# Patient Record
Sex: MALE | HISPANIC OR LATINO | Employment: FULL TIME | URBAN - METROPOLITAN AREA
[De-identification: names, ages, dates, MRNs, and addresses within clinical notes are randomized per-mention and may not be internally consistent; named-entity substitution may affect disease eponyms.]

---

## 2019-07-31 ENCOUNTER — TRANSCRIBE ORDERS (OUTPATIENT)
Dept: VASCULAR SURGERY | Facility: HOSPITAL | Age: 29
End: 2019-07-31

## 2019-08-05 ENCOUNTER — TRANSCRIBE ORDERS (OUTPATIENT)
Dept: ADMINISTRATIVE | Facility: HOSPITAL | Age: 29
End: 2019-08-05

## 2019-08-05 DIAGNOSIS — R79.89 ABNORMAL THYROID BLOOD TEST: Primary | ICD-10-CM

## 2019-08-14 ENCOUNTER — HOSPITAL ENCOUNTER (OUTPATIENT)
Dept: NUCLEAR MEDICINE | Facility: HOSPITAL | Age: 29
Discharge: HOME | End: 2019-08-14

## 2019-08-14 ENCOUNTER — HOSPITAL ENCOUNTER (OUTPATIENT)
Dept: ULTRASOUND IMAGING | Facility: HOSPITAL | Age: 29
Discharge: HOME | End: 2019-08-14
Admitting: NURSE PRACTITIONER

## 2019-08-14 DIAGNOSIS — R79.89 ABNORMAL THYROID BLOOD TEST: ICD-10-CM

## 2019-08-14 PROCEDURE — 76536 US EXAM OF HEAD AND NECK: CPT

## 2019-08-14 PROCEDURE — 0 SODIUM IODIDE 3.7 MBQ CAPSULE: Performed by: NURSE PRACTITIONER

## 2019-08-14 PROCEDURE — A9516 IODINE I-123 SOD IODIDE MIC: HCPCS | Performed by: NURSE PRACTITIONER

## 2019-08-14 PROCEDURE — 78014 THYROID IMAGING W/BLOOD FLOW: CPT

## 2019-08-14 RX ORDER — SODIUM IODIDE I 123 100 UCI/1
1 CAPSULE, GELATIN COATED ORAL
Status: COMPLETED | OUTPATIENT
Start: 2019-08-14 | End: 2019-08-14

## 2019-08-14 RX ADMIN — SODIUM IODIDE I 123 1 CAPSULE: 100 CAPSULE, GELATIN COATED ORAL at 12:00

## 2019-08-15 ENCOUNTER — HOSPITAL ENCOUNTER (OUTPATIENT)
Dept: NUCLEAR MEDICINE | Facility: HOSPITAL | Age: 29
Discharge: HOME | End: 2019-08-15

## 2019-12-16 ENCOUNTER — HOSPITAL ENCOUNTER (EMERGENCY)
Facility: HOSPITAL | Age: 29
Discharge: HOME | End: 2019-12-16
Attending: EMERGENCY MEDICINE | Admitting: EMERGENCY MEDICINE

## 2019-12-16 ENCOUNTER — APPOINTMENT (OUTPATIENT)
Dept: GENERAL RADIOLOGY | Facility: HOSPITAL | Age: 29
End: 2019-12-16

## 2019-12-16 VITALS
TEMPERATURE: 98 F | SYSTOLIC BLOOD PRESSURE: 142 MMHG | HEIGHT: 66 IN | OXYGEN SATURATION: 97 % | RESPIRATION RATE: 18 BRPM | HEART RATE: 74 BPM | DIASTOLIC BLOOD PRESSURE: 76 MMHG | BODY MASS INDEX: 46.13 KG/M2 | WEIGHT: 287.04 LBS

## 2019-12-16 DIAGNOSIS — R06.00 DYSPNEA, UNSPECIFIED TYPE: Primary | ICD-10-CM

## 2019-12-16 DIAGNOSIS — J20.9 ACUTE BRONCHITIS, UNSPECIFIED ORGANISM: ICD-10-CM

## 2019-12-16 LAB
FLUAV SUBTYP SPEC NAA+PROBE: NOT DETECTED
FLUBV RNA ISLT QL NAA+PROBE: NOT DETECTED

## 2019-12-16 PROCEDURE — 87502 INFLUENZA DNA AMP PROBE: CPT | Performed by: EMERGENCY MEDICINE

## 2019-12-16 PROCEDURE — 99283 EMERGENCY DEPT VISIT LOW MDM: CPT

## 2019-12-16 PROCEDURE — 93005 ELECTROCARDIOGRAM TRACING: CPT | Performed by: EMERGENCY MEDICINE

## 2019-12-16 PROCEDURE — 71045 X-RAY EXAM CHEST 1 VIEW: CPT

## 2019-12-16 RX ORDER — AZITHROMYCIN 250 MG/1
TABLET, FILM COATED ORAL
Qty: 6 TABLET | Refills: 0 | OUTPATIENT
Start: 2019-12-16 | End: 2020-05-27

## 2019-12-17 NOTE — ED PROVIDER NOTES
Subjective   Patient is a 29-year-old male complaint cough congestion shortness of breath that developed over the past several days.  The cough is productive.  He denies fever chest pain Bondar dysuria or other complaint.          Review of Systems  Negative for headache earache sore throat fever chest pain vomiting vomit diarrhea dysuria or other complaint  No past medical history on file.    No Known Allergies    No past surgical history on file.    No family history on file.    Social History     Socioeconomic History   • Marital status: Single     Spouse name: Not on file   • Number of children: Not on file   • Years of education: Not on file   • Highest education level: Not on file           Objective   Physical Exam  HEENT exam shows TMs to be clear.  Oropharynx, spit sclerae nonicteric.  Neck has no adenopathy JVD or bruits.  Lungs are clear.  Heart has a regular rate rhythm without murmur rub or gallop.  Chest is nontender.  Abdomen is soft nontender.  Extremity exam is no cyanosis or edema.  Procedures     EKG interpretation shows normal sinus rhythm with no acute ST change      ED Course      Results for orders placed or performed during the hospital encounter of 12/16/19   Influenza Antigen, Rapid - Swab, Nasopharynx   Result Value Ref Range    Influenza A PCR Not Detected Not Detected    Influenza B PCR Not Detected Not Detected     Xr Chest 1 View    Result Date: 12/16/2019  No active disease.  Electronically Signed By-Myke Dennis On:12/16/2019 6:54 PM This report was finalized on [id number] by  Myke Dennis, .                    No data recorded                        MDM  Number of Diagnoses or Management Options  Diagnosis management comments: Patient has findings consistent with acute bronchitis causing his dyspnea.  He has no evidence of pneumonia or influenza.  Will be discharged with a prescription for Zithromax and will see his MD for recheck.       Amount and/or Complexity of Data  Reviewed  Clinical lab tests: reviewed  Tests in the radiology section of CPT®: reviewed  Tests in the medicine section of CPT®: reviewed    Risk of Complications, Morbidity, and/or Mortality  Presenting problems: moderate  Diagnostic procedures: moderate  Management options: moderate    Patient Progress  Patient progress: stable      Final diagnoses:   Dyspnea, unspecified type   Acute bronchitis, unspecified organism              Dennis Berg MD  12/16/19 2019       Dennis Berg MD  12/16/19 2020

## 2020-05-27 ENCOUNTER — APPOINTMENT (OUTPATIENT)
Dept: GENERAL RADIOLOGY | Facility: HOSPITAL | Age: 30
End: 2020-05-27

## 2020-05-27 ENCOUNTER — HOSPITAL ENCOUNTER (EMERGENCY)
Facility: HOSPITAL | Age: 30
Discharge: HOME | End: 2020-05-27
Attending: EMERGENCY MEDICINE | Admitting: EMERGENCY MEDICINE

## 2020-05-27 VITALS
SYSTOLIC BLOOD PRESSURE: 128 MMHG | RESPIRATION RATE: 18 BRPM | HEIGHT: 66 IN | HEART RATE: 95 BPM | WEIGHT: 299.38 LBS | OXYGEN SATURATION: 99 % | BODY MASS INDEX: 48.12 KG/M2 | DIASTOLIC BLOOD PRESSURE: 72 MMHG | TEMPERATURE: 98.1 F

## 2020-05-27 DIAGNOSIS — J98.8 RESPIRATORY INFECTION: Primary | ICD-10-CM

## 2020-05-27 LAB — SARS-COV-2 RNA RESP QL NAA+PROBE: NOT DETECTED

## 2020-05-27 PROCEDURE — 99284 EMERGENCY DEPT VISIT MOD MDM: CPT

## 2020-05-27 PROCEDURE — 87635 SARS-COV-2 COVID-19 AMP PRB: CPT | Performed by: EMERGENCY MEDICINE

## 2020-05-27 PROCEDURE — 71046 X-RAY EXAM CHEST 2 VIEWS: CPT

## 2020-05-27 RX ORDER — ALBUTEROL SULFATE 90 UG/1
2 AEROSOL, METERED RESPIRATORY (INHALATION) EVERY 6 HOURS PRN
Qty: 1 INHALER | Refills: 0 | Status: SHIPPED | OUTPATIENT
Start: 2020-05-27

## 2020-05-27 RX ORDER — AZITHROMYCIN 250 MG/1
TABLET, FILM COATED ORAL
Qty: 6 TABLET | Refills: 0 | Status: SHIPPED | OUTPATIENT
Start: 2020-05-27 | End: 2022-04-15

## 2020-05-28 ENCOUNTER — PATIENT OUTREACH (OUTPATIENT)
Dept: CASE MANAGEMENT | Facility: OTHER | Age: 30
End: 2020-05-28

## 2020-05-28 NOTE — OUTREACH NOTE
ED Potential Covid Discharge Follow-up    Pt discharged from [facility] ED on 5/27/20, seen for cough, congestion, fever, chills, covid 19 testing performed. RN-ACM outreach call made to pt. Pt reports he called the health dept today and received negative covid test results. Pt reports he is feeling better, states he was a little short of breath this morning but he feels 100% better. Pt denies fever or chest pain. Pt reports he is taking the antibiotic as prescribed and he got the inhaler rx filled. Reviewed ED AVS with pt. Education provided. Emphasized importance of completing antibiotic prescription. Pt denies food, medication, or transportation insecurities. No questions or concerns per pt. Pt declines [facility] 24 hour nurse line and covid hotline numbers. Advised pt to call RN-ACM with any needs. Follow up outreach as needed.     Marcela Chappell RN  Ambulatory     5/28/2020, 13:53

## 2020-05-28 NOTE — OUTREACH NOTE
Care Coordination Note    Called pt's PCP office, no answer, left message informing PCP office staff that pt was seen at [facility] ED on 5/27/20, covid 19 testing was performed, pt reports he will be calling to schedule follow up. Left RN-ACM contact information to call with any questions.      Marcela Chappell RN  Ambulatory     5/28/2020, 14:06

## 2020-12-30 ENCOUNTER — HOSPITAL ENCOUNTER (EMERGENCY)
Facility: HOSPITAL | Age: 30
Discharge: HOME | End: 2020-12-31
Attending: EMERGENCY MEDICINE | Admitting: EMERGENCY MEDICINE

## 2020-12-30 VITALS
HEART RATE: 112 BPM | SYSTOLIC BLOOD PRESSURE: 170 MMHG | TEMPERATURE: 100.8 F | DIASTOLIC BLOOD PRESSURE: 84 MMHG | OXYGEN SATURATION: 96 % | BODY MASS INDEX: 45.19 KG/M2 | WEIGHT: 287.92 LBS | HEIGHT: 67 IN | RESPIRATION RATE: 20 BRPM

## 2020-12-30 DIAGNOSIS — R50.9 FEVER, UNSPECIFIED FEVER CAUSE: ICD-10-CM

## 2020-12-30 DIAGNOSIS — J02.9 ACUTE PHARYNGITIS, UNSPECIFIED ETIOLOGY: Primary | ICD-10-CM

## 2020-12-30 LAB — SARS-COV-2 RNA PNL SPEC NAA+PROBE: NORMAL

## 2020-12-30 PROCEDURE — 87635 SARS-COV-2 COVID-19 AMP PRB: CPT | Performed by: EMERGENCY MEDICINE

## 2020-12-30 PROCEDURE — C9803 HOPD COVID-19 SPEC COLLECT: HCPCS

## 2020-12-30 PROCEDURE — 99283 EMERGENCY DEPT VISIT LOW MDM: CPT

## 2020-12-30 RX ORDER — ACETAMINOPHEN 500 MG
1000 TABLET ORAL ONCE
Status: COMPLETED | OUTPATIENT
Start: 2020-12-30 | End: 2020-12-31

## 2020-12-31 LAB
ANION GAP SERPL CALCULATED.3IONS-SCNC: 12 MMOL/L (ref 5–15)
BASOPHILS # BLD AUTO: 0.1 10*3/MM3 (ref 0–0.2)
BASOPHILS NFR BLD AUTO: 0.5 % (ref 0–1.5)
BUN SERPL-MCNC: 9 MG/DL (ref 6–20)
BUN/CREAT SERPL: 8.7 (ref 7–25)
CALCIUM SPEC-SCNC: 9.6 MG/DL (ref 8.6–10.5)
CHLORIDE SERPL-SCNC: 94 MMOL/L (ref 98–107)
CO2 SERPL-SCNC: 28 MMOL/L (ref 22–29)
CREAT SERPL-MCNC: 1.04 MG/DL (ref 0.76–1.27)
DEPRECATED RDW RBC AUTO: 38.9 FL (ref 37–54)
EOSINOPHIL # BLD AUTO: 0 10*3/MM3 (ref 0–0.4)
EOSINOPHIL NFR BLD AUTO: 0 % (ref 0.3–6.2)
ERYTHROCYTE [DISTWIDTH] IN BLOOD BY AUTOMATED COUNT: 13.5 % (ref 12.3–15.4)
FLUAV SUBTYP SPEC NAA+PROBE: NOT DETECTED
FLUBV RNA ISLT QL NAA+PROBE: NOT DETECTED
GFR SERPL CREATININE-BSD FRML MDRD: 102 ML/MIN/1.73
GFR SERPL CREATININE-BSD FRML MDRD: 84 ML/MIN/1.73
GLUCOSE SERPL-MCNC: 118 MG/DL (ref 65–99)
HCT VFR BLD AUTO: 48.7 % (ref 37.5–51)
HGB BLD-MCNC: 16.4 G/DL (ref 13–17.7)
LYMPHOCYTES # BLD AUTO: 0.9 10*3/MM3 (ref 0.7–3.1)
LYMPHOCYTES NFR BLD AUTO: 7.2 % (ref 19.6–45.3)
MCH RBC QN AUTO: 27.8 PG (ref 26.6–33)
MCHC RBC AUTO-ENTMCNC: 33.7 G/DL (ref 31.5–35.7)
MCV RBC AUTO: 82.6 FL (ref 79–97)
MONOCYTES # BLD AUTO: 0.9 10*3/MM3 (ref 0.1–0.9)
MONOCYTES NFR BLD AUTO: 7.3 % (ref 5–12)
NEUTROPHILS NFR BLD AUTO: 11 10*3/MM3 (ref 1.7–7)
NEUTROPHILS NFR BLD AUTO: 85 % (ref 42.7–76)
NRBC BLD AUTO-RTO: 0.4 /100 WBC (ref 0–0.2)
PLATELET # BLD AUTO: 174 10*3/MM3 (ref 140–450)
PMV BLD AUTO: 7.6 FL (ref 6–12)
POTASSIUM SERPL-SCNC: 3.9 MMOL/L (ref 3.5–5.2)
RBC # BLD AUTO: 5.89 10*6/MM3 (ref 4.14–5.8)
SODIUM SERPL-SCNC: 134 MMOL/L (ref 136–145)
WBC # BLD AUTO: 12.9 10*3/MM3 (ref 3.4–10.8)

## 2020-12-31 PROCEDURE — 87502 INFLUENZA DNA AMP PROBE: CPT | Performed by: EMERGENCY MEDICINE

## 2020-12-31 PROCEDURE — 85025 COMPLETE CBC W/AUTO DIFF WBC: CPT | Performed by: EMERGENCY MEDICINE

## 2020-12-31 PROCEDURE — 80048 BASIC METABOLIC PNL TOTAL CA: CPT | Performed by: EMERGENCY MEDICINE

## 2020-12-31 PROCEDURE — 36415 COLL VENOUS BLD VENIPUNCTURE: CPT

## 2020-12-31 RX ORDER — AMOXICILLIN 875 MG/1
875 TABLET, COATED ORAL 2 TIMES DAILY
Qty: 20 TABLET | Refills: 0 | Status: SHIPPED | OUTPATIENT
Start: 2020-12-31 | End: 2022-04-15

## 2020-12-31 RX ADMIN — ACETAMINOPHEN 1000 MG: 500 TABLET, FILM COATED ORAL at 00:27

## 2022-02-15 ENCOUNTER — TELEPHONE (OUTPATIENT)
Dept: FAMILY MEDICINE CLINIC | Facility: CLINIC | Age: 32
End: 2022-02-15

## 2022-02-15 NOTE — TELEPHONE ENCOUNTER
He used to be a patient in our office many years ago.  I agreed to take him back.  Please schedule appointment down the road, not before my upcoming vacation.  Thank you.

## 2022-02-15 NOTE — TELEPHONE ENCOUNTER
Caller: Juvenal Rossi    Relationship to patient: Self    Best call back number: [phone]    Chief complaint: LOW TESTOSTERONE    Type of visit: NEW PATIENT    Requested date: ASAP    Additional notes: PATIENT'S SISTER (BOOM ROSSI ASKED DR CHAN TODAY IF SHE WOULD TAKE HIM BACK AS A PATIENT AND SHE SAID YES TO CALL AND SCHEDULE. PLEASE ADVISE.

## 2022-04-15 ENCOUNTER — OFFICE VISIT (OUTPATIENT)
Dept: FAMILY MEDICINE CLINIC | Facility: CLINIC | Age: 32
End: 2022-04-15

## 2022-04-15 VITALS
SYSTOLIC BLOOD PRESSURE: 152 MMHG | DIASTOLIC BLOOD PRESSURE: 82 MMHG | WEIGHT: 291 LBS | BODY MASS INDEX: 45.67 KG/M2 | RESPIRATION RATE: 16 BRPM | HEART RATE: 109 BPM | HEIGHT: 67 IN | TEMPERATURE: 96.9 F | OXYGEN SATURATION: 95 %

## 2022-04-15 DIAGNOSIS — Z00.00 PREVENTATIVE HEALTH CARE: Primary | ICD-10-CM

## 2022-04-15 PROCEDURE — 99385 PREV VISIT NEW AGE 18-39: CPT | Performed by: FAMILY MEDICINE

## 2022-04-15 NOTE — PROGRESS NOTES
Subjective   Chief Complaint   Patient presents with   • Establish Care   • Annual Exam     Juvenal Rossi is a 32 y.o. male.     Patient Care Team:  Susanna Myers MD as PCP - General (Family Medicine)    He is coming in today to get reestablished and also want to get a checkup.  He tells me that about 6 years ago he had gained a lot of weight, at that time he had some testing done and he was found to have pretty low testosterone level.  He was started on testosterone shots and stayed on them for some time, but then when pandemic started he discontinued.  He works full-time.  He is trying to stay a reasonably active.  He currently does not take any medications. Patient does not report any chest pain, shortness of breath, dizziness, nausea, vomiting, or diarrhea, visual issues, headaches, numbness or tingling. No urinary issues reported like urgency, frequency, or discomfort upon urination.  No significant weight changes reported.  No swelling reported.  No rashes or any other skin issues reported. No emotional issues or insomnia.       The following portions of the patient's history were reviewed and updated as appropriate: allergies, current medications, past family history, past medical history, past social history, past surgical history and problem list.  Past Medical History:   Diagnosis Date   • Low testosterone in male    • Obesity    • Rupture of anterior cruciate ligament of right knee      Past Surgical History:   Procedure Laterality Date   • KNEE ACL RECONSTRUCTION Right    • KNEE ARTHROSCOPY W/ ACL RECONSTRUCTION      x2   • KNEE MENISCAL REPAIR Right    • MENISCECTOMY Left 2008     The patient has a family history of  Family History   Problem Relation Age of Onset   • Aneurysm Father      Social History     Socioeconomic History   • Marital status: Single   Tobacco Use   • Smoking status: Never Smoker   • Smokeless tobacco: Never Used   Vaping Use   • Vaping Use: Some days   Substance and Sexual  "Activity   • Alcohol use: Yes     Alcohol/week: 24.0 standard drinks     Types: 24 Cans of beer per week   • Drug use: Not Currently   • Sexual activity: Yes       Review of Systems   Constitutional: Positive for unexpected weight gain. Negative for activity change, appetite change, chills, fatigue, fever and unexpected weight loss.   HENT: Negative for congestion, ear pain, hearing loss, nosebleeds, postnasal drip, swollen glands, tinnitus and trouble swallowing.    Eyes: Negative for blurred vision, double vision and visual disturbance.   Respiratory: Negative for cough, choking, chest tightness, shortness of breath, wheezing and stridor.    Cardiovascular: Negative for chest pain, palpitations and leg swelling.   Gastrointestinal: Negative for abdominal distention, abdominal pain, anal bleeding, constipation, diarrhea, nausea, vomiting and GERD.   Endocrine: Negative for cold intolerance, heat intolerance and polyphagia.   Genitourinary: Negative for dysuria, flank pain, frequency, hematuria and urgency.   Musculoskeletal: Negative for arthralgias, back pain, gait problem, joint swelling and neck pain.   Skin: Negative for color change, dry skin and skin lesions.   Allergic/Immunologic: Negative for environmental allergies and food allergies.   Neurological: Negative for dizziness, tremors, speech difficulty, light-headedness, numbness, headache and confusion.   Hematological: Negative for adenopathy. Does not bruise/bleed easily.   Psychiatric/Behavioral: Negative for decreased concentration, sleep disturbance, depressed mood and stress.     Visit Vitals  /82 (BP Location: Left arm, Patient Position: Sitting, Cuff Size: Adult)   Pulse 109   Temp 96.9 °F (36.1 °C) (Temporal)   Resp 16   Ht 170.2 cm (67\")   Wt 132 kg (291 lb)   SpO2 95%   BMI 45.58 kg/m²       Current Outpatient Medications:   •  albuterol sulfate  (90 Base) MCG/ACT inhaler, Inhale 2 puffs Every 6 (Six) Hours As Needed for Wheezing., " Disp: 1 inhaler, Rfl: 0    Objective   Physical Exam  Vitals and nursing note reviewed.   Constitutional:       General: He is not in acute distress.     Appearance: He is well-developed. He is not diaphoretic.   HENT:      Head: Normocephalic and atraumatic.      Right Ear: External ear normal.      Left Ear: External ear normal.   Eyes:      General: No scleral icterus.        Right eye: No discharge.         Left eye: No discharge.      Conjunctiva/sclera: Conjunctivae normal.      Pupils: Pupils are equal, round, and reactive to light.   Neck:      Thyroid: No thyromegaly.      Vascular: No JVD.   Cardiovascular:      Rate and Rhythm: Normal rate and regular rhythm.      Heart sounds: Normal heart sounds. No murmur heard.    No friction rub. No gallop.   Pulmonary:      Effort: Pulmonary effort is normal. No respiratory distress.      Breath sounds: Normal breath sounds. No stridor. No wheezing, rhonchi or rales.   Abdominal:      General: Bowel sounds are normal. There is no distension.      Palpations: Abdomen is soft. There is no mass.      Tenderness: There is no abdominal tenderness. There is no guarding or rebound.      Hernia: No hernia is present.   Musculoskeletal:         General: No swelling, tenderness or deformity. Normal range of motion.      Cervical back: Normal range of motion and neck supple. No muscular tenderness.      Right lower leg: No edema.      Left lower leg: No edema.   Lymphadenopathy:      Cervical: No cervical adenopathy.   Skin:     General: Skin is warm and dry.      Capillary Refill: Capillary refill takes less than 2 seconds.      Coloration: Skin is not pale.      Findings: No bruising, erythema, lesion or rash.   Neurological:      General: No focal deficit present.      Mental Status: He is alert and oriented to person, place, and time.      Cranial Nerves: No cranial nerve deficit.      Sensory: No sensory deficit.      Motor: No weakness.      Coordination: Coordination  normal.      Deep Tendon Reflexes: Reflexes normal.   Psychiatric:         Mood and Affect: Mood normal.         Behavior: Behavior normal.         Judgment: Judgment normal.       Assessment/Plan   Diagnoses and all orders for this visit:    1. Preventative health care (Primary)  -     CBC Auto Differential  -     Comprehensive Metabolic Panel  -     Lipid Panel  -     Hemoglobin A1c  -     Vitamin D 25 Hydroxy  -     TSH  -     T4, Free  -     Testosterone (Free & Total), LC / MS; Future  -     Hepatitis C Antibody      Wellness exam was done today - see above for details. Healthy life style was reviewed and discussed and re-enforced. Regular exercise and healthy diet were also discussed and recommended.  I will be getting fasting blood work.  I will advise further once these results are available.        Return in about 1 year (around 4/15/2023) for Annual physical.    Requested Prescriptions      No prescriptions requested or ordered in this encounter

## 2022-06-20 ENCOUNTER — LAB (OUTPATIENT)
Dept: LAB | Facility: HOSPITAL | Age: 32
End: 2022-06-20

## 2022-06-20 DIAGNOSIS — Z00.00 PREVENTATIVE HEALTH CARE: ICD-10-CM

## 2022-06-20 LAB — HBA1C MFR BLD: 6.1 % (ref 3.5–5.6)

## 2022-06-20 PROCEDURE — 84402 ASSAY OF FREE TESTOSTERONE: CPT

## 2022-06-20 PROCEDURE — 82306 VITAMIN D 25 HYDROXY: CPT | Performed by: FAMILY MEDICINE

## 2022-06-20 PROCEDURE — 84403 ASSAY OF TOTAL TESTOSTERONE: CPT

## 2022-06-20 PROCEDURE — 80061 LIPID PANEL: CPT | Performed by: FAMILY MEDICINE

## 2022-06-20 PROCEDURE — 80050 GENERAL HEALTH PANEL: CPT | Performed by: FAMILY MEDICINE

## 2022-06-20 PROCEDURE — 84439 ASSAY OF FREE THYROXINE: CPT | Performed by: FAMILY MEDICINE

## 2022-06-20 PROCEDURE — 83036 HEMOGLOBIN GLYCOSYLATED A1C: CPT | Performed by: FAMILY MEDICINE

## 2022-06-20 PROCEDURE — 86803 HEPATITIS C AB TEST: CPT | Performed by: FAMILY MEDICINE

## 2022-06-20 PROCEDURE — 36415 COLL VENOUS BLD VENIPUNCTURE: CPT | Performed by: FAMILY MEDICINE

## 2022-06-21 LAB
25(OH)D3 SERPL-MCNC: 48.9 NG/ML (ref 30–100)
ALBUMIN SERPL-MCNC: 5.1 G/DL (ref 3.5–5.2)
ALBUMIN/GLOB SERPL: 1.6 G/DL
ALP SERPL-CCNC: 59 U/L (ref 39–117)
ALT SERPL W P-5'-P-CCNC: 67 U/L (ref 1–41)
ANION GAP SERPL CALCULATED.3IONS-SCNC: 12.8 MMOL/L (ref 5–15)
AST SERPL-CCNC: 39 U/L (ref 1–40)
BASOPHILS # BLD AUTO: 0.04 10*3/MM3 (ref 0–0.2)
BASOPHILS NFR BLD AUTO: 0.7 % (ref 0–1.5)
BILIRUB SERPL-MCNC: 0.6 MG/DL (ref 0–1.2)
BUN SERPL-MCNC: 11 MG/DL (ref 6–20)
BUN/CREAT SERPL: 12.9 (ref 7–25)
CALCIUM SPEC-SCNC: 10.2 MG/DL (ref 8.6–10.5)
CHLORIDE SERPL-SCNC: 99 MMOL/L (ref 98–107)
CHOLEST SERPL-MCNC: 207 MG/DL (ref 0–200)
CO2 SERPL-SCNC: 28.2 MMOL/L (ref 22–29)
CREAT SERPL-MCNC: 0.85 MG/DL (ref 0.76–1.27)
DEPRECATED RDW RBC AUTO: 38.7 FL (ref 37–54)
EGFRCR SERPLBLD CKD-EPI 2021: 118.4 ML/MIN/1.73
EOSINOPHIL # BLD AUTO: 0.07 10*3/MM3 (ref 0–0.4)
EOSINOPHIL NFR BLD AUTO: 1.2 % (ref 0.3–6.2)
ERYTHROCYTE [DISTWIDTH] IN BLOOD BY AUTOMATED COUNT: 13.4 % (ref 12.3–15.4)
GLOBULIN UR ELPH-MCNC: 3.1 GM/DL
GLUCOSE SERPL-MCNC: 94 MG/DL (ref 65–99)
HCT VFR BLD AUTO: 48 % (ref 37.5–51)
HCV AB SER DONR QL: NORMAL
HDLC SERPL-MCNC: 42 MG/DL (ref 40–60)
HGB BLD-MCNC: 16.2 G/DL (ref 13–17.7)
IMM GRANULOCYTES # BLD AUTO: 0.07 10*3/MM3 (ref 0–0.05)
IMM GRANULOCYTES NFR BLD AUTO: 1.2 % (ref 0–0.5)
LDLC SERPL CALC-MCNC: 117 MG/DL (ref 0–100)
LDLC/HDLC SERPL: 2.62 {RATIO}
LYMPHOCYTES # BLD AUTO: 2.07 10*3/MM3 (ref 0.7–3.1)
LYMPHOCYTES NFR BLD AUTO: 34.2 % (ref 19.6–45.3)
MCH RBC QN AUTO: 27.5 PG (ref 26.6–33)
MCHC RBC AUTO-ENTMCNC: 33.8 G/DL (ref 31.5–35.7)
MCV RBC AUTO: 81.5 FL (ref 79–97)
MONOCYTES # BLD AUTO: 0.57 10*3/MM3 (ref 0.1–0.9)
MONOCYTES NFR BLD AUTO: 9.4 % (ref 5–12)
NEUTROPHILS NFR BLD AUTO: 3.24 10*3/MM3 (ref 1.7–7)
NEUTROPHILS NFR BLD AUTO: 53.3 % (ref 42.7–76)
NRBC BLD AUTO-RTO: 0 /100 WBC (ref 0–0.2)
PLATELET # BLD AUTO: 239 10*3/MM3 (ref 140–450)
PMV BLD AUTO: 10.2 FL (ref 6–12)
POTASSIUM SERPL-SCNC: 4 MMOL/L (ref 3.5–5.2)
PROT SERPL-MCNC: 8.2 G/DL (ref 6–8.5)
RBC # BLD AUTO: 5.89 10*6/MM3 (ref 4.14–5.8)
SODIUM SERPL-SCNC: 140 MMOL/L (ref 136–145)
T4 FREE SERPL-MCNC: 1.28 NG/DL (ref 0.93–1.7)
TRIGL SERPL-MCNC: 275 MG/DL (ref 0–150)
TSH SERPL DL<=0.05 MIU/L-ACNC: 1.48 UIU/ML (ref 0.27–4.2)
VLDLC SERPL-MCNC: 48 MG/DL (ref 5–40)
WBC NRBC COR # BLD: 6.06 10*3/MM3 (ref 3.4–10.8)

## 2022-06-23 LAB
TESTOST FREE SERPL-MCNC: 16.6 PG/ML (ref 8.7–25.1)
TESTOST SERPL-MCNC: 254.5 NG/DL (ref 264–916)

## 2022-06-24 ENCOUNTER — OFFICE VISIT (OUTPATIENT)
Dept: FAMILY MEDICINE CLINIC | Facility: CLINIC | Age: 32
End: 2022-06-24

## 2022-06-24 ENCOUNTER — TELEPHONE (OUTPATIENT)
Dept: FAMILY MEDICINE CLINIC | Facility: CLINIC | Age: 32
End: 2022-06-24

## 2022-06-24 VITALS
RESPIRATION RATE: 16 BRPM | OXYGEN SATURATION: 95 % | WEIGHT: 305 LBS | SYSTOLIC BLOOD PRESSURE: 117 MMHG | HEIGHT: 67 IN | BODY MASS INDEX: 47.87 KG/M2 | TEMPERATURE: 98.4 F | HEART RATE: 81 BPM | DIASTOLIC BLOOD PRESSURE: 77 MMHG

## 2022-06-24 DIAGNOSIS — R79.89 LOW TESTOSTERONE IN MALE: ICD-10-CM

## 2022-06-24 DIAGNOSIS — J02.9 SORE THROAT: ICD-10-CM

## 2022-06-24 DIAGNOSIS — R73.03 PREDIABETES: ICD-10-CM

## 2022-06-24 DIAGNOSIS — R74.8 ELEVATED LIVER ENZYMES: ICD-10-CM

## 2022-06-24 DIAGNOSIS — E78.2 MIXED HYPERLIPIDEMIA: Primary | ICD-10-CM

## 2022-06-24 LAB
EXPIRATION DATE: NORMAL
INTERNAL CONTROL: NORMAL
Lab: NORMAL
S PYO AG THROAT QL: NEGATIVE

## 2022-06-24 PROCEDURE — 87880 STREP A ASSAY W/OPTIC: CPT | Performed by: FAMILY MEDICINE

## 2022-06-24 PROCEDURE — 99214 OFFICE O/P EST MOD 30 MIN: CPT | Performed by: FAMILY MEDICINE

## 2022-06-24 RX ORDER — TESTOSTERONE 20.25 MG/1.25G
2 GEL TOPICAL DAILY
Qty: 75 G | Refills: 2 | Status: SHIPPED | OUTPATIENT
Start: 2022-06-24 | End: 2022-07-11 | Stop reason: SDUPTHER

## 2022-06-24 RX ORDER — TESTOSTERONE 20.25 MG/1.25G
2 GEL TOPICAL DAILY
Qty: 75 G | Refills: 2 | Status: SHIPPED | OUTPATIENT
Start: 2022-06-24 | End: 2022-06-24 | Stop reason: SDUPTHER

## 2022-06-24 RX ORDER — TESTOSTERONE 20.25 MG/1.25G
2 GEL TOPICAL DAILY
Qty: 75 G | Refills: 2 | Status: CANCELLED | OUTPATIENT
Start: 2022-06-24

## 2022-06-24 NOTE — TELEPHONE ENCOUNTER
.    Caller: Juvenal Rossi    Relationship: Self    Best call back number:[phone]    Requested Prescriptions:   Requested Prescriptions     Pending Prescriptions Disp Refills   • Testosterone (AndroGel) 20.25 MG/1.25GM (1.62%) gel 75 g 2     Sig: Place 2 application on the skin as directed by provider Daily.        Pharmacy where request should be sent: [id number] [place] [address] [facility] [place] [address] & [facility] - [phone] [facility] [phone]      Additional details provided by patient: THE PHARMACY THAT THIS PRESCRIPTION WAS SENT TO FIRST IS OUT OF PATIENTS NETWORK PLEASE SEND TO [facility]    Does the patient have less than a 3 day supply:  [] Yes  [x] No    Daisy Nguyen Rep   06/24/22 15:54 EDT         ”

## 2022-06-24 NOTE — PROGRESS NOTES
Subjective   Chief Complaint   Patient presents with   • discuss recent labs   • tonsils sore   • Prediabetes   • Hyperlipidemia   • Sore Throat     Juvenal Rossi is a 32 y.o. male.     Patient Care Team:  Susanna Myers MD as PCP - General (Family Medicine)    He is coming in today to follow-up on his recent labs.  We are addressing low testosterone, hyperlipidemia, elevated blood sugar in prediabetes range, and elevated liver enzymes.  He tells me that few years ago he was on testosterone replacement therapy due to low testosterone in the form of gel, back then he used to have fatigue and weight gain and it was definitely improved after he started testosterone replacement therapy and he would like to consider that again.  He has been trying to improve his diet, but he has had hard time to lose weight.  He also wants to talk about the sore throat, which has been going on for the last few days, he has noted that his tonsils are rather enlarged.  He is able to swallow, but that causes some discomfort.  He denies any fever or any other upper respiratory symptoms.       The following portions of the patient's history were reviewed and updated as appropriate: allergies, current medications, past family history, past medical history, past social history, past surgical history and problem list.  Past Medical History:   Diagnosis Date   • Low testosterone in male    • Obesity    • Rupture of anterior cruciate ligament of right knee      Past Surgical History:   Procedure Laterality Date   • KNEE ACL RECONSTRUCTION Right    • KNEE ARTHROSCOPY W/ ACL RECONSTRUCTION      x2   • KNEE MENISCAL REPAIR Right    • MENISCECTOMY Left 2008     The patient has a family history of  Family History   Problem Relation Age of Onset   • Aneurysm Father      Social History     Socioeconomic History   • Marital status: Single   Tobacco Use   • Smoking status: Never Smoker   • Smokeless tobacco: Never Used   Vaping Use   • Vaping Use:  "Some days   Substance and Sexual Activity   • Alcohol use: Yes     Alcohol/week: 24.0 standard drinks     Types: 24 Cans of beer per week   • Drug use: Not Currently   • Sexual activity: Yes       Review of Systems   Constitutional: Negative for activity change, fatigue and fever.   HENT: Positive for sore throat. Negative for congestion, dental problem and ear discharge.    Respiratory: Negative for cough, shortness of breath and wheezing.    Cardiovascular: Negative for chest pain, palpitations and leg swelling.   Gastrointestinal: Negative for constipation, diarrhea and indigestion.   Skin: Negative for color change, dry skin and rash.   Neurological: Negative for tremors and headache.     Visit Vitals  /77 (BP Location: Left arm, Patient Position: Sitting, Cuff Size: Adult)   Pulse 81   Temp 98.4 °F (36.9 °C)   Resp 16   Ht 170.2 cm (67.01\")   Wt (!) 138 kg (305 lb)   SpO2 95%   BMI 47.76 kg/m²       Current Outpatient Medications:   •  albuterol sulfate  (90 Base) MCG/ACT inhaler, Inhale 2 puffs Every 6 (Six) Hours As Needed for Wheezing., Disp: 1 inhaler, Rfl: 0  •  Testosterone (AndroGel) 20.25 MG/1.25GM (1.62%) gel, Place 2 application on the skin as directed by provider Daily., Disp: 75 g, Rfl: 2    Objective   Physical Exam  Vitals and nursing note reviewed.   Constitutional:       General: He is not in acute distress.     Appearance: Normal appearance. He is well-developed. He is not ill-appearing.   HENT:      Head: Normocephalic and atraumatic.      Mouth/Throat:      Pharynx: Posterior oropharyngeal erythema present. No oropharyngeal exudate.      Comments: Tonsils are slightly inflamed.  Cardiovascular:      Rate and Rhythm: Normal rate and regular rhythm.      Heart sounds: Normal heart sounds. No murmur heard.    No gallop.   Pulmonary:      Effort: Pulmonary effort is normal. No respiratory distress.      Breath sounds: Normal breath sounds. No wheezing, rhonchi or rales.   Chest:      " Chest wall: No tenderness.   Musculoskeletal:      Cervical back: Normal range of motion and neck supple.   Neurological:      General: No focal deficit present.      Mental Status: He is alert and oriented to person, place, and time. Mental status is at baseline.   Psychiatric:         Mood and Affect: Mood normal.         Office Visit on 06/24/2022   Component Date Value Ref Range Status   • Rapid Strep A Screen 06/24/2022 Negative  Negative, VALID, INVALID, Not Performed Final   • Internal Control 06/24/2022 Passed  Passed Final   • Lot Number 06/24/2022 [id number]   Final   • Expiration Date 06/24/2022 05/31/2022   Final   Lab on 06/20/2022   Component Date Value Ref Range Status   • Testosterone, Total 06/20/2022 254.5 (A) 264.0 - 916.0 ng/dL Final    This [facility] LC/MS-MS method is currently certified by the CDC  Hormone Standardization Program (HoSt). Adult male reference  interval is based on a population of healthy nonobese males  (BMI <30) between 19 and 39 years old. Manisha et.al. JCEM  2017,102;0121-2668. PMID: 86438492.   • Testosterone, Free 06/20/2022 16.6  8.7 - 25.1 pg/mL Final       FOLLOWING LABS WERE REVIEWED TODAY:  CMP    CMP 6/20/22   Glucose 94   BUN 11   Creatinine 0.85   Sodium 140   Potassium 4.0   Chloride 99   Calcium 10.2   Albumin 5.10   Total Bilirubin 0.6   Alkaline Phosphatase 59   AST (SGOT) 39   ALT (SGPT) 67 (A)   (A) Abnormal value            CBC    CBC 6/20/22   WBC 6.06   RBC 5.89 (A)   Hemoglobin 16.2   Hematocrit 48.0   MCV 81.5   MCH 27.5   MCHC 33.8   RDW 13.4   Platelets 239   (A) Abnormal value            Lipid Panel    Lipid Panel 6/20/22   Total Cholesterol 207 (A)   Triglycerides 275 (A)   HDL Cholesterol 42   VLDL Cholesterol 48 (A)   LDL Cholesterol  117 (A)   LDL/HDL Ratio 2.62   (A) Abnormal value            Most Recent A1C    HGBA1C Most Recent 6/20/22   Hemoglobin A1C 6.1 (A)   (A) Abnormal value                  Assessment & Plan   Diagnoses and all orders  for this visit:    1. Mixed hyperlipidemia (Primary)  -     Lipid Panel    2. Prediabetes  -     Hemoglobin A1c    3. Elevated liver enzymes  -     Comprehensive Metabolic Panel    4. Low testosterone in male  -     CBC Auto Differential  -     Comprehensive Metabolic Panel  -     Testosterone; Future  -     Testosterone (AndroGel) 20.25 MG/1.25GM (1.62%) gel; Place 2 application on the skin as directed by provider Daily.  Dispense: 75 g; Refill: 2    5. Sore throat  -     POC Rapid Strep A      I reviewed his recent blood work results.  He has some abnormalities they are noted including hyperlipidemia and elevated hemoglobin A1c in prediabetes range.  One of the liver enzymes is also slightly elevated.  He tells me that he was previously advised about fatty liver.  Healthy lifestyle and improved diet and weight loss were discussed and stressed.  His testosterone level is slightly decreased.  Patient previously did testosterone replacement therapy and he would like to proceed again.  I also addressed his upper respiratory symptoms, strep test was done today and it was negative.  He was advised to try over-the-counter allergy medication and monitor the symptoms.            Return in about 3 months (around 9/24/2022) for Next scheduled follow up.    Requested Prescriptions     Signed Prescriptions Disp Refills   • Testosterone (AndroGel) 20.25 MG/1.25GM (1.62%) gel 75 g 2     Sig: Place 2 application on the skin as directed by provider Daily.

## 2022-06-29 ENCOUNTER — TELEPHONE (OUTPATIENT)
Dept: FAMILY MEDICINE CLINIC | Facility: CLINIC | Age: 32
End: 2022-06-29

## 2022-06-29 NOTE — TELEPHONE ENCOUNTER
I have contacted the patient and informed him we have received the Prior  authorization request for testosterone. I explained the process and that once a determination is made I will call him and the pharmacy as well. He verbally understood.

## 2022-06-29 NOTE — TELEPHONE ENCOUNTER
.    Caller: Juvenal Rossi    Relationship: Self    Best call back number: [phone]     What form or medical record are you requesting:     PHARMACY TOLD PATIENT THAT HE WOULD NEED A PRIOR AUTHORIZATION FOR HIS MEDICATION FOR THE INSURANCE TO COVER IT.    Testosterone (AndroGel) 20.25 MG/1.25GM (1.62%) gel        Timeframe paperwork needed: ASAP    Additional notes:     PATIENT WOULD LIKE A CALL BACK TO LET HIM KNOW WHEN THIS HAS BEEN DONE SO THAT HE CAN GO AND  HIS MEDICATION PLEASE.

## 2022-06-30 DIAGNOSIS — R79.89 LOW TESTOSTERONE IN MALE: Primary | ICD-10-CM

## 2022-07-08 ENCOUNTER — LAB (OUTPATIENT)
Dept: LAB | Facility: HOSPITAL | Age: 32
End: 2022-07-08

## 2022-07-08 DIAGNOSIS — R79.89 LOW TESTOSTERONE IN MALE: ICD-10-CM

## 2022-07-08 LAB
ALBUMIN SERPL-MCNC: 4.7 G/DL (ref 3.5–5.2)
ALBUMIN/GLOB SERPL: 1.9 G/DL
ALP SERPL-CCNC: 62 U/L (ref 39–117)
ALT SERPL W P-5'-P-CCNC: 61 U/L (ref 1–41)
ANION GAP SERPL CALCULATED.3IONS-SCNC: 10 MMOL/L (ref 5–15)
AST SERPL-CCNC: 35 U/L (ref 1–40)
BASOPHILS # BLD AUTO: 0.06 10*3/MM3 (ref 0–0.2)
BASOPHILS NFR BLD AUTO: 0.9 % (ref 0–1.5)
BILIRUB SERPL-MCNC: 0.4 MG/DL (ref 0–1.2)
BUN SERPL-MCNC: 13 MG/DL (ref 6–20)
BUN/CREAT SERPL: 14.8 (ref 7–25)
CALCIUM SPEC-SCNC: 8.9 MG/DL (ref 8.6–10.5)
CHLORIDE SERPL-SCNC: 99 MMOL/L (ref 98–107)
CHOLEST SERPL-MCNC: 187 MG/DL (ref 0–200)
CO2 SERPL-SCNC: 27 MMOL/L (ref 22–29)
CREAT SERPL-MCNC: 0.88 MG/DL (ref 0.76–1.27)
DEPRECATED RDW RBC AUTO: 37.6 FL (ref 37–54)
EGFRCR SERPLBLD CKD-EPI 2021: 117.2 ML/MIN/1.73
EOSINOPHIL # BLD AUTO: 0.09 10*3/MM3 (ref 0–0.4)
EOSINOPHIL NFR BLD AUTO: 1.3 % (ref 0.3–6.2)
ERYTHROCYTE [DISTWIDTH] IN BLOOD BY AUTOMATED COUNT: 13.1 % (ref 12.3–15.4)
GLOBULIN UR ELPH-MCNC: 2.5 GM/DL
GLUCOSE SERPL-MCNC: 104 MG/DL (ref 65–99)
HBA1C MFR BLD: 6 % (ref 3.5–5.6)
HCT VFR BLD AUTO: 43.8 % (ref 37.5–51)
HDLC SERPL-MCNC: 37 MG/DL (ref 40–60)
HGB BLD-MCNC: 15.1 G/DL (ref 13–17.7)
IMM GRANULOCYTES # BLD AUTO: 0.09 10*3/MM3 (ref 0–0.05)
IMM GRANULOCYTES NFR BLD AUTO: 1.3 % (ref 0–0.5)
LDLC SERPL CALC-MCNC: 113 MG/DL (ref 0–100)
LDLC/HDLC SERPL: 2.9 {RATIO}
LYMPHOCYTES # BLD AUTO: 2.16 10*3/MM3 (ref 0.7–3.1)
LYMPHOCYTES NFR BLD AUTO: 31.6 % (ref 19.6–45.3)
MCH RBC QN AUTO: 27.5 PG (ref 26.6–33)
MCHC RBC AUTO-ENTMCNC: 34.5 G/DL (ref 31.5–35.7)
MCV RBC AUTO: 79.8 FL (ref 79–97)
MONOCYTES # BLD AUTO: 0.69 10*3/MM3 (ref 0.1–0.9)
MONOCYTES NFR BLD AUTO: 10.1 % (ref 5–12)
NEUTROPHILS NFR BLD AUTO: 3.74 10*3/MM3 (ref 1.7–7)
NEUTROPHILS NFR BLD AUTO: 54.8 % (ref 42.7–76)
NRBC BLD AUTO-RTO: 0 /100 WBC (ref 0–0.2)
PLATELET # BLD AUTO: 225 10*3/MM3 (ref 140–450)
PMV BLD AUTO: 10 FL (ref 6–12)
POTASSIUM SERPL-SCNC: 4.2 MMOL/L (ref 3.5–5.2)
PROT SERPL-MCNC: 7.2 G/DL (ref 6–8.5)
RBC # BLD AUTO: 5.49 10*6/MM3 (ref 4.14–5.8)
SODIUM SERPL-SCNC: 136 MMOL/L (ref 136–145)
TESTOST SERPL-MCNC: 194 NG/DL (ref 249–836)
TRIGL SERPL-MCNC: 213 MG/DL (ref 0–150)
VLDLC SERPL-MCNC: 37 MG/DL (ref 5–40)
WBC NRBC COR # BLD: 6.83 10*3/MM3 (ref 3.4–10.8)

## 2022-07-08 PROCEDURE — 83036 HEMOGLOBIN GLYCOSYLATED A1C: CPT | Performed by: FAMILY MEDICINE

## 2022-07-08 PROCEDURE — 80053 COMPREHEN METABOLIC PANEL: CPT | Performed by: FAMILY MEDICINE

## 2022-07-08 PROCEDURE — 85025 COMPLETE CBC W/AUTO DIFF WBC: CPT | Performed by: FAMILY MEDICINE

## 2022-07-08 PROCEDURE — 84403 ASSAY OF TOTAL TESTOSTERONE: CPT

## 2022-07-08 PROCEDURE — 36415 COLL VENOUS BLD VENIPUNCTURE: CPT | Performed by: FAMILY MEDICINE

## 2022-07-08 PROCEDURE — 80061 LIPID PANEL: CPT | Performed by: FAMILY MEDICINE

## 2022-07-11 ENCOUNTER — TELEPHONE (OUTPATIENT)
Dept: FAMILY MEDICINE CLINIC | Facility: CLINIC | Age: 32
End: 2022-07-11

## 2022-07-11 DIAGNOSIS — R79.89 LOW TESTOSTERONE IN MALE: ICD-10-CM

## 2022-07-11 RX ORDER — TESTOSTERONE 20.25 MG/1.25G
2 GEL TOPICAL DAILY
Qty: 75 G | Refills: 2 | Status: SHIPPED | OUTPATIENT
Start: 2022-07-11 | End: 2022-09-01 | Stop reason: SDUPTHER

## 2022-07-11 NOTE — PROGRESS NOTES
Patient did not answer his phone and no voicemail set up. I did send him a my chart message and asked him to respond to that so I know he received the message

## 2022-07-11 NOTE — PROGRESS NOTES
I called and spoke to the patient and he says he is aware of the risks associate with fertility. He said he was on testosterone for 3 years with 25 again and did fine. He said he stopped the testosterone during COVID due to cost,, and now his wife is pregnant with twins and does not want anymore after this. So he would really like you to prescribe the testosterone please.

## 2022-09-01 DIAGNOSIS — R79.89 LOW TESTOSTERONE IN MALE: ICD-10-CM

## 2022-09-01 RX ORDER — TESTOSTERONE 20.25 MG/1.25G
2 GEL TOPICAL DAILY
Qty: 75 G | Refills: 2 | Status: SHIPPED | OUTPATIENT
Start: 2022-09-01 | End: 2022-10-13 | Stop reason: SDUPTHER

## 2022-09-01 NOTE — TELEPHONE ENCOUNTER
Caller: Juvenal Rossi    Relationship: Self    Best call back number:[phone]    Requested Prescriptions:   Requested Prescriptions     Pending Prescriptions Disp Refills   • Testosterone (AndroGel) 20.25 MG/1.25GM (1.62%) gel 75 g 2     Sig: Place 2 application on the skin as directed by provider Daily.        Pharmacy where request should be sent: [id number] [address] [facility] [address] - [phone]  - [phone] FX     Additional details provided by patient:    Does the patient have less than a 3 day supply:  [x] Yes  [] No    Daisy Carrion Rep   09/01/22 12:06 EDT

## 2022-10-06 DIAGNOSIS — R79.89 LOW TESTOSTERONE IN MALE: ICD-10-CM

## 2022-10-06 RX ORDER — TESTOSTERONE 20.25 MG/1.25G
2 GEL TOPICAL DAILY
Qty: 75 G | Refills: 2 | OUTPATIENT
Start: 2022-10-06

## 2022-10-06 NOTE — TELEPHONE ENCOUNTER
Caller: Juvenal Rossi    Relationship: Self    Best call back number: [phone]    Requested Prescriptions:   Requested Prescriptions     Pending Prescriptions Disp Refills   • Testosterone (AndroGel) 20.25 MG/1.25GM (1.62%) gel 75 g 2     Sig: Place 2 application on the skin as directed by provider Daily.        Pharmacy where request should be sent: [facility][id number] [address] [facility] [address] - [phone]  - [phone] FX     Additional details provided by patient:   Does the patient have less than a 3 day supply:  [x] Yes  [] No    Daisy Carrion Rep   10/06/22 11:33 EDT

## 2022-10-13 DIAGNOSIS — R79.89 LOW TESTOSTERONE IN MALE: ICD-10-CM

## 2022-10-13 RX ORDER — TESTOSTERONE 20.25 MG/1.25G
2 GEL TOPICAL DAILY
Qty: 75 G | Refills: 0 | Status: SHIPPED | OUTPATIENT
Start: 2022-10-13 | End: 2022-12-06 | Stop reason: SDUPTHER

## 2022-10-13 NOTE — TELEPHONE ENCOUNTER
Caller: Juvenal Rossi    Relationship: Self    Best call back number: [phone]       Requested Prescriptions:   Requested Prescriptions     Pending Prescriptions Disp Refills   • Testosterone (AndroGel) 20.25 MG/1.25GM (1.62%) gel 75 g 2     Sig: Place 2 application on the skin as directed by provider Daily.        Pharmacy where request should be sent: [facility] #[id number] - [place], [place] - [address] [facility] [address] - [phone]  - [phone] FX     Additional details provided by patient: PATIENT SCHEDULED AN APPOINTMENT FOR NEXT THURSDAY     Does the patient have less than a 3 day supply:  [x] Yes  [] No    Daisy Lyn Rep   10/13/22 13:06 EDT

## 2022-10-20 ENCOUNTER — OFFICE VISIT (OUTPATIENT)
Dept: FAMILY MEDICINE CLINIC | Facility: CLINIC | Age: 32
End: 2022-10-20

## 2022-10-20 VITALS
HEIGHT: 66 IN | RESPIRATION RATE: 16 BRPM | TEMPERATURE: 97.5 F | DIASTOLIC BLOOD PRESSURE: 99 MMHG | SYSTOLIC BLOOD PRESSURE: 140 MMHG | WEIGHT: 315 LBS | BODY MASS INDEX: 50.62 KG/M2 | HEART RATE: 77 BPM | OXYGEN SATURATION: 96 %

## 2022-10-20 DIAGNOSIS — R79.89 LOW TESTOSTERONE IN MALE: ICD-10-CM

## 2022-10-20 DIAGNOSIS — R73.03 PREDIABETES: ICD-10-CM

## 2022-10-20 DIAGNOSIS — E66.01 MORBID OBESITY: ICD-10-CM

## 2022-10-20 DIAGNOSIS — R14.0 ABDOMINAL BLOATING: Primary | ICD-10-CM

## 2022-10-20 DIAGNOSIS — K21.9 GASTROESOPHAGEAL REFLUX DISEASE WITHOUT ESOPHAGITIS: ICD-10-CM

## 2022-10-20 PROBLEM — J02.9 SORE THROAT: Status: RESOLVED | Noted: 2022-06-24 | Resolved: 2022-10-20

## 2022-10-20 PROBLEM — Z99.89 OSA ON CPAP: Status: ACTIVE | Noted: 2022-10-20

## 2022-10-20 PROBLEM — G47.33 OSA ON CPAP: Status: ACTIVE | Noted: 2022-10-20

## 2022-10-20 PROCEDURE — 99214 OFFICE O/P EST MOD 30 MIN: CPT | Performed by: FAMILY MEDICINE

## 2022-10-20 RX ORDER — OMEPRAZOLE 40 MG/1
40 CAPSULE, DELAYED RELEASE ORAL DAILY
Qty: 30 CAPSULE | Refills: 0 | Status: SHIPPED | OUTPATIENT
Start: 2022-10-20 | End: 2023-02-13

## 2022-10-20 NOTE — PROGRESS NOTES
Subjective   Chief Complaint   Patient presents with   • Med Refill   • low testosterone   • Bloated   • Weight Gain     Juvenal Rossi is a 32 y.o. male.     Patient Care Team:  Susanna Myers MD as PCP - General (Family Medicine)    History of Present Illness  He is coming in today to follow-up on his medical issues and his medications as well as to address his blood work from 6/2022 and address some concerns.  He has been on testosterone replacement therapy for about 6 weeks, he is  with 3 children and he does not desire to have any more kids in the future.  His blood work showed elevated blood sugar and hemoglobin A1c in prediabetes range.  Also his fasting lipid panel was elevated.  There is a family history of diabetes.  He tells me that over the last several months he has gained a lot of weight, he has not been eating very healthy and he has not been exercising.  He drinks beer on the weekends and typically drinks about 24 beers through the weekend.  He also has noted some abdominal bloating after he eats.  It does not really matter what kind of food it is.  He also has noted some indigestion and he has been taking Tums slightly which helps.       The following portions of the patient's history were reviewed and updated as appropriate: allergies, current medications, past family history, past medical history, past social history, past surgical history and problem list.  Past Medical History:   Diagnosis Date   • Low testosterone in male    • Obesity    • Rupture of anterior cruciate ligament of right knee      Past Surgical History:   Procedure Laterality Date   • KNEE ACL RECONSTRUCTION Right    • KNEE ARTHROSCOPY W/ ACL RECONSTRUCTION      x2   • KNEE MENISCAL REPAIR Right    • MENISCECTOMY Left 2008     The patient has a family history of  Family History   Problem Relation Age of Onset   • Cancer Mother         Breast cancer   • Aneurysm Father      Social History     Socioeconomic History   •  "Marital status: Single   Tobacco Use   • Smoking status: Never   • Smokeless tobacco: Never   Vaping Use   • Vaping Use: Some days   Substance and Sexual Activity   • Alcohol use: Yes     Alcohol/week: 24.0 standard drinks     Types: 24 Cans of beer per week   • Drug use: Not Currently   • Sexual activity: Yes     Partners: Female       Review of Systems   Constitutional: Positive for fatigue. Negative for activity change and fever.   Respiratory: Negative for shortness of breath and wheezing.    Cardiovascular: Negative for chest pain, palpitations and leg swelling.   Gastrointestinal: Positive for GERD and indigestion.   Musculoskeletal: Negative for arthralgias and back pain.   Skin: Negative for rash.   Neurological: Negative for tremors and headache.     Visit Vitals  /99 (BP Location: Left arm, Patient Position: Sitting, Cuff Size: Adult)   Pulse 77   Temp 97.5 °F (36.4 °C) (Temporal)   Resp 16   Ht 167.6 cm (66\")   Wt (!) 147 kg (324 lb)   SpO2 96%   BMI 52.29 kg/m²       Current Outpatient Medications:   •  albuterol sulfate  (90 Base) MCG/ACT inhaler, Inhale 2 puffs Every 6 (Six) Hours As Needed for Wheezing., Disp: 1 inhaler, Rfl: 0  •  Testosterone (AndroGel) 20.25 MG/1.25GM (1.62%) gel, Place 2 application on the skin as directed by provider Daily., Disp: 75 g, Rfl: 0  •  omeprazole (priLOSEC) 40 MG capsule, Take 1 capsule by mouth Daily., Disp: 30 capsule, Rfl: 0    Objective   Physical Exam  Constitutional:       General: He is not in acute distress.     Appearance: Normal appearance. He is well-developed. He is not ill-appearing or diaphoretic.      Comments: Patient is in no distress, patient has normal voice and speech.  Normal respiratory effort.   HENT:      Head: Normocephalic and atraumatic.   Pulmonary:      Effort: Pulmonary effort is normal.   Abdominal:      General: There is no distension.      Palpations: There is no mass.      Tenderness: There is no guarding or rebound.      " Comments: There is some mild palpation tenderness noted in epigastric area.   Musculoskeletal:      Cervical back: Normal range of motion and neck supple.   Neurological:      General: No focal deficit present.      Mental Status: He is alert and oriented to person, place, and time. Mental status is at baseline.   Psychiatric:         Mood and Affect: Mood normal.         No visits with results within 7 Day(s) from this visit.   Latest known visit with results is:   Lab on 07/08/2022   Component Date Value Ref Range Status   • Testosterone, Total 07/08/2022 194.00 (L)  249.00 - 836.00 ng/dL Final       FOLLOWING LABS WERE REVIEWED TODAY:  CMP    CMP 6/20/22 7/8/22   Glucose 94 104 (A)   BUN 11 13   Creatinine 0.85 0.88   Sodium 140 136   Potassium 4.0 4.2   Chloride 99 99   Calcium 10.2 8.9   Albumin 5.10 4.70   Total Bilirubin 0.6 0.4   Alkaline Phosphatase 59 62   AST (SGOT) 39 35   ALT (SGPT) 67 (A) 61 (A)   (A) Abnormal value            CBC    CBC 6/20/22 7/8/22   WBC 6.06 6.83   RBC 5.89 (A) 5.49   Hemoglobin 16.2 15.1   Hematocrit 48.0 43.8   MCV 81.5 79.8   MCH 27.5 27.5   MCHC 33.8 34.5   RDW 13.4 13.1   Platelets 239 225   (A) Abnormal value            Lipid Panel    Lipid Panel 6/20/22 7/8/22   Total Cholesterol 207 (A) 187   Triglycerides 275 (A) 213 (A)   HDL Cholesterol 42 37 (A)   VLDL Cholesterol 48 (A) 37   LDL Cholesterol  117 (A) 113 (A)   LDL/HDL Ratio 2.62 2.90   (A) Abnormal value            Most Recent A1C    HGBA1C Most Recent 7/8/22   Hemoglobin A1C 6.0 (A)   (A) Abnormal value              Assessment & Plan   Diagnoses and all orders for this visit:    1. Abdominal bloating (Primary)  -     US Gallbladder    2. Low testosterone in male    3. Gastroesophageal reflux disease without esophagitis  -     omeprazole (priLOSEC) 40 MG capsule; Take 1 capsule by mouth Daily.  Dispense: 30 capsule; Refill: 0    4. Prediabetes    5. Morbid obesity (HCC)    6. BMI 50.0-59.9, adult (HCC)      I reviewed  his labs from 6/2022.  He may continue testosterone replacement therapy, he understands that this may affect his ability to conceive a child in the future.  He is currently  with 3 children and there is no plan for any desire pregnancy in the future.  I will be checking his fasting labs again in 3 months.  He has gained significant amount of weight over the last several months.  Healthy diet and increase physical activity were discussed and stressed.  There is a family history of diabetes and his blood sugar has been staying in prediabetes range.  I will be getting right upper quadrant ultrasound to rule out gallbladder issues due to reported abdominal bloating.  He also has symptoms consistent with acid reflux and I will be starting him on PPI.      Return in about 3 months (around 1/20/2023) for Next scheduled follow up.    Requested Prescriptions     Signed Prescriptions Disp Refills   • omeprazole (priLOSEC) 40 MG capsule 30 capsule 0     Sig: Take 1 capsule by mouth Daily.

## 2022-12-06 DIAGNOSIS — R79.89 LOW TESTOSTERONE IN MALE: ICD-10-CM

## 2022-12-06 RX ORDER — TESTOSTERONE 20.25 MG/1.25G
2 GEL TOPICAL DAILY
Qty: 75 G | Refills: 0 | Status: SHIPPED | OUTPATIENT
Start: 2022-12-06 | End: 2023-02-13 | Stop reason: SDUPTHER

## 2022-12-06 NOTE — TELEPHONE ENCOUNTER
Caller: Juvenal Rossi    Relationship: Self    Best call back number: [phone]    Requested Prescriptions:   Requested Prescriptions     Pending Prescriptions Disp Refills   • Testosterone (AndroGel) 20.25 MG/1.25GM (1.62%) gel 75 g 0     Sig: Place 2 application on the skin as directed by provider Daily.        Pharmacy where request should be sent: [facility]/PHARMACY #[id number] [facility] [address] - [phone]  - [phone] FX     Additional details provided by patient: PATIENT STATES HE IS COMPLETELY OUT OF THIS PRESCRIPTION.    Does the patient have less than a 3 day supply:  [x] Yes  [] No    Would you like a call back once the refill request has been completed: [] Yes [x] No    If the office needs to give you a call back, can they leave a voicemail: [] Yes [x] No    Daisy Levine Rep   12/06/22 12:59 EST

## 2023-02-08 DIAGNOSIS — R79.89 LOW TESTOSTERONE IN MALE: ICD-10-CM

## 2023-02-08 RX ORDER — TESTOSTERONE 20.25 MG/1.25G
2 GEL TOPICAL DAILY
Qty: 75 G | Refills: 0 | OUTPATIENT
Start: 2023-02-08

## 2023-02-08 NOTE — TELEPHONE ENCOUNTER
Caller: Juvenal Rossi    Relationship: Self    Best call back number: [phone]    Requested Prescriptions:   Requested Prescriptions     Pending Prescriptions Disp Refills   • Testosterone (AndroGel) 20.25 MG/1.25GM (1.62%) gel 75 g 0     Sig: Place 2 application on the skin as directed by provider Daily.        Pharmacy where request should be sent: [facility]/PHARMACY #[id number] - [place], [place] - [address] - [phone]  - [phone] FX        Does the patient have less than a 3 day supply:  [] Yes  [x] No    Would you like a call back once the refill request has been completed: [] Yes [x] No    If the office needs to give you a call back, can they leave a voicemail: [] Yes [x] No    Gonzalez Flowers   02/08/23 11:22 EST

## 2023-02-10 DIAGNOSIS — R79.89 LOW TESTOSTERONE IN MALE: ICD-10-CM

## 2023-02-11 RX ORDER — TESTOSTERONE 16.2 MG/G
GEL TRANSDERMAL
Qty: 75 G | Refills: 0 | OUTPATIENT
Start: 2023-02-11

## 2023-02-13 ENCOUNTER — OFFICE VISIT (OUTPATIENT)
Dept: FAMILY MEDICINE CLINIC | Facility: CLINIC | Age: 33
End: 2023-02-13
Payer: COMMERCIAL

## 2023-02-13 ENCOUNTER — TELEPHONE (OUTPATIENT)
Dept: FAMILY MEDICINE CLINIC | Facility: CLINIC | Age: 33
End: 2023-02-13

## 2023-02-13 VITALS
WEIGHT: 315 LBS | DIASTOLIC BLOOD PRESSURE: 83 MMHG | RESPIRATION RATE: 16 BRPM | SYSTOLIC BLOOD PRESSURE: 125 MMHG | TEMPERATURE: 97.5 F | OXYGEN SATURATION: 95 % | BODY MASS INDEX: 50.62 KG/M2 | HEIGHT: 66 IN | HEART RATE: 83 BPM

## 2023-02-13 DIAGNOSIS — R79.89 LOW TESTOSTERONE IN MALE: ICD-10-CM

## 2023-02-13 DIAGNOSIS — G89.29 CHRONIC PAIN OF RIGHT KNEE: Primary | ICD-10-CM

## 2023-02-13 DIAGNOSIS — M25.561 CHRONIC PAIN OF RIGHT KNEE: Primary | ICD-10-CM

## 2023-02-13 PROCEDURE — 99214 OFFICE O/P EST MOD 30 MIN: CPT | Performed by: FAMILY MEDICINE

## 2023-02-13 RX ORDER — TESTOSTERONE 20.25 MG/1.25G
2 GEL TOPICAL DAILY
Qty: 75 G | Refills: 2 | Status: SHIPPED | OUTPATIENT
Start: 2023-02-13 | End: 2023-03-31 | Stop reason: SDUPTHER

## 2023-02-13 NOTE — TELEPHONE ENCOUNTER
Caller: Juvenal Rossi    Relationship: Self    Best call back number: *[phone] (Mobile)    What was the call regarding: DR CASILLAS IS THE DR THAT PREFORMED 2 KNEE SURGERIES ON PATIENT     Do you require a callback:  IF NEEDED

## 2023-02-13 NOTE — PROGRESS NOTES
Subjective   Chief Complaint   Patient presents with   • Knee Pain     Right   • low testosterone   • Med Refill     Juvenal Rossi is a 33 y.o. male.     Patient Care Team:  Susanna Myers MD as PCP - General (Family Medicine)    History of Present Illness  He is coming in today to follow-up on his low testosterone.  He would like to continue his testosterone replacement therapy.  He reports that this helps him with energy and overall improves his functioning.  He understands that testosterone replacement in this age can affect the sperm production and his ability to conceive if desired in the future.  He has 3 children.  He also wants to talk about his right knee pain.  He used to play sports in the young age and had different injuries to both knees.  He had 2 ACL repairs on the right knee, he has been having on and off issues with that knee for some time, over the last couple of months the pain is worse and affects his daily functioning.  He has hard time to stay longer in the same position or also when he walks for longer the pain gets worse.  He perceives the pain deep in the knee.  He feels that the knee is chronically swollen       The following portions of the patient's history were reviewed and updated as appropriate: allergies, current medications, past family history, past medical history, past social history, past surgical history and problem list.  Past Medical History:   Diagnosis Date   • Low testosterone in male    • Obesity    • Rupture of anterior cruciate ligament of right knee      Past Surgical History:   Procedure Laterality Date   • KNEE ACL RECONSTRUCTION Right    • KNEE ARTHROSCOPY W/ ACL RECONSTRUCTION      x2   • KNEE MENISCAL REPAIR Right    • MENISCECTOMY Left 2008     The patient has a family history of  Family History   Problem Relation Age of Onset   • Cancer Mother         Breast cancer   • Aneurysm Father      Social History     Socioeconomic History   • Marital status: Single  "  Tobacco Use   • Smoking status: Never   • Smokeless tobacco: Never   Vaping Use   • Vaping Use: Some days   Substance and Sexual Activity   • Alcohol use: Yes     Alcohol/week: 24.0 standard drinks     Types: 24 Cans of beer per week   • Drug use: Not Currently   • Sexual activity: Yes     Partners: Female       Review of Systems   Constitutional: Negative for activity change.   Respiratory: Negative for shortness of breath and wheezing.    Cardiovascular: Negative for palpitations and leg swelling.   Musculoskeletal: Negative for back pain.   Neurological: Negative for tremors and headache.     Visit Vitals  /83 (BP Location: Left arm, Patient Position: Sitting, Cuff Size: Adult)   Pulse 83   Temp 97.5 °F (36.4 °C) (Infrared)   Resp 16   Ht 167.6 cm (65.98\")   Wt (!) 144 kg (316 lb 6.4 oz)   SpO2 95%   BMI 51.09 kg/m²       Current Outpatient Medications:   •  albuterol sulfate  (90 Base) MCG/ACT inhaler, Inhale 2 puffs Every 6 (Six) Hours As Needed for Wheezing., Disp: 1 inhaler, Rfl: 0  •  Testosterone (AndroGel) 20.25 MG/1.25GM (1.62%) gel, Place 2 application on the skin as directed by provider Daily., Disp: 75 g, Rfl: 2    Objective   Physical Exam  Constitutional:       General: He is not in acute distress.     Appearance: Normal appearance. He is well-developed. He is not ill-appearing or diaphoretic.      Comments: Patient is in no distress, patient has normal voice and speech.  Normal respiratory effort.   HENT:      Head: Normocephalic and atraumatic.   Pulmonary:      Effort: Pulmonary effort is normal.   Musculoskeletal:      Cervical back: Normal range of motion and neck supple.      Comments: Right knee was examined, it seems to be slightly swollen when compared to the left knee.  No deformity.  Full range of motion.  No joint line palpation tenderness noted.   Neurological:      General: No focal deficit present.      Mental Status: He is alert and oriented to person, place, and time. " Mental status is at baseline.   Psychiatric:         Mood and Affect: Mood normal.           No visits with results within 7 Day(s) from this visit.   Latest known visit with results is:   Lab on 07/08/2022   Component Date Value Ref Range Status   • Testosterone, Total 07/08/2022 194.00 (L)  249.00 - 836.00 ng/dL Final       CMP    CMP 6/20/22 7/8/22   Glucose 94 104 (A)   BUN 11 13   Creatinine 0.85 0.88   eGFR 118.4 117.2   Sodium 140 136   Potassium 4.0 4.2   Chloride 99 99   Calcium 10.2 8.9   Total Protein 8.2 7.2   Albumin 5.10 4.70   Globulin 3.1 2.5   Total Bilirubin 0.6 0.4   Alkaline Phosphatase 59 62   AST (SGOT) 39 35   ALT (SGPT) 67 (A) 61 (A)   Albumin/Globulin Ratio 1.6 1.9   BUN/Creatinine Ratio 12.9 14.8   Anion Gap 12.8 10.0   (A) Abnormal value       Comments are available for some flowsheets but are not being displayed.           CBC    CBC 6/20/22 7/8/22   WBC 6.06 6.83   RBC 5.89 (A) 5.49   Hemoglobin 16.2 15.1   Hematocrit 48.0 43.8   MCV 81.5 79.8   MCH 27.5 27.5   MCHC 33.8 34.5   RDW 13.4 13.1   Platelets 239 225   (A) Abnormal value              Assessment & Plan   Diagnoses and all orders for this visit:    1. Chronic pain of right knee (Primary)  -     XR Knee 1 or 2 View Right; Future  -     Ambulatory Referral to Orthopedic Surgery    2. Low testosterone in male  -     Testosterone (AndroGel) 20.25 MG/1.25GM (1.62%) gel; Place 2 application on the skin as directed by provider Daily.  Dispense: 75 g; Refill: 2  -     CBC Auto Differential  -     Comprehensive Metabolic Panel  -     Testosterone; Future      I reviewed his symptoms and concerns as well as his previous blood work results.  He may continue testosterone replacement therapy.  Patient understands that this might have negative impact on his potential to be able to conceive, he does not desire to have more children.  I also addressed his chronic right knee pain which has been getting worse over the last several weeks.  Patient  previously had multiple injuries in that knee and arthroscopic procedure due to torn ACL.  I will be getting x-ray and referring him to see Ortho for further evaluation.      Return in about 3 months (around 5/13/2023) for Next scheduled follow up.    Requested Prescriptions     Signed Prescriptions Disp Refills   • Testosterone (AndroGel) 20.25 MG/1.25GM (1.62%) gel 75 g 2     Sig: Place 2 application on the skin as directed by provider Daily.

## 2023-03-31 DIAGNOSIS — R79.89 LOW TESTOSTERONE IN MALE: ICD-10-CM

## 2023-03-31 RX ORDER — TESTOSTERONE 20.25 MG/1.25G
2 GEL TOPICAL DAILY
Qty: 75 G | Refills: 1 | Status: SHIPPED | OUTPATIENT
Start: 2023-03-31

## 2023-03-31 NOTE — TELEPHONE ENCOUNTER
Caller: Juvenal Rossi    Relationship: Self    Best call back number: [phone]    Requested Prescriptions:   Requested Prescriptions     Pending Prescriptions Disp Refills   • Testosterone (AndroGel) 20.25 MG/1.25GM (1.62%) gel 75 g 2     Sig: Place 2 application on the skin as directed by provider Daily.        Pharmacy where request should be sent: [facility]/PHARMACY #[id number] [facility] [address] [facility] [address] - [phone]  - [phone] FX     Last office visit with prescribing clinician: 2/13/2023   Last telemedicine visit with prescribing clinician: 5/2/2023   Next office visit with prescribing clinician: 5/9/2023     Additional details provided by patient: PATIENT IS ALL OUT OF MEDICATION    Does the patient have less than a 3 day supply:  [x] Yes  [] No    Would you like a call back once the refill request has been completed: [x] Yes [] No    If the office needs to give you a call back, can they leave a voicemail: [x] Yes [] No    Daisy Dominguez Rep   03/31/23 14:09 EDT

## 2023-05-02 ENCOUNTER — LAB (OUTPATIENT)
Dept: FAMILY MEDICINE CLINIC | Facility: CLINIC | Age: 33
End: 2023-05-02
Payer: COMMERCIAL

## 2023-05-02 DIAGNOSIS — R79.89 LOW TESTOSTERONE IN MALE: ICD-10-CM

## 2023-05-02 LAB
ALBUMIN SERPL-MCNC: 4.5 G/DL (ref 3.5–5.2)
ALBUMIN/GLOB SERPL: 1.5 G/DL
ALP SERPL-CCNC: 66 U/L (ref 39–117)
ALT SERPL W P-5'-P-CCNC: 56 U/L (ref 1–41)
ANION GAP SERPL CALCULATED.3IONS-SCNC: 11 MMOL/L (ref 5–15)
AST SERPL-CCNC: 34 U/L (ref 1–40)
BASOPHILS # BLD AUTO: 0.05 10*3/MM3 (ref 0–0.2)
BASOPHILS NFR BLD AUTO: 0.8 % (ref 0–1.5)
BILIRUB SERPL-MCNC: 0.2 MG/DL (ref 0–1.2)
BUN SERPL-MCNC: 11 MG/DL (ref 6–20)
BUN/CREAT SERPL: 13.1 (ref 7–25)
CALCIUM SPEC-SCNC: 9.5 MG/DL (ref 8.6–10.5)
CHLORIDE SERPL-SCNC: 103 MMOL/L (ref 98–107)
CO2 SERPL-SCNC: 26 MMOL/L (ref 22–29)
CREAT SERPL-MCNC: 0.84 MG/DL (ref 0.76–1.27)
DEPRECATED RDW RBC AUTO: 37.2 FL (ref 37–54)
EGFRCR SERPLBLD CKD-EPI 2021: 118.1 ML/MIN/1.73
EOSINOPHIL # BLD AUTO: 0.2 10*3/MM3 (ref 0–0.4)
EOSINOPHIL NFR BLD AUTO: 3 % (ref 0.3–6.2)
ERYTHROCYTE [DISTWIDTH] IN BLOOD BY AUTOMATED COUNT: 13.2 % (ref 12.3–15.4)
GLOBULIN UR ELPH-MCNC: 3.1 GM/DL
GLUCOSE SERPL-MCNC: 102 MG/DL (ref 65–99)
HCT VFR BLD AUTO: 46.4 % (ref 37.5–51)
HGB BLD-MCNC: 15.9 G/DL (ref 13–17.7)
IMM GRANULOCYTES # BLD AUTO: 0.06 10*3/MM3 (ref 0–0.05)
IMM GRANULOCYTES NFR BLD AUTO: 0.9 % (ref 0–0.5)
LYMPHOCYTES # BLD AUTO: 2.8 10*3/MM3 (ref 0.7–3.1)
LYMPHOCYTES NFR BLD AUTO: 42.6 % (ref 19.6–45.3)
MCH RBC QN AUTO: 27.1 PG (ref 26.6–33)
MCHC RBC AUTO-ENTMCNC: 34.3 G/DL (ref 31.5–35.7)
MCV RBC AUTO: 79 FL (ref 79–97)
MONOCYTES # BLD AUTO: 0.67 10*3/MM3 (ref 0.1–0.9)
MONOCYTES NFR BLD AUTO: 10.2 % (ref 5–12)
NEUTROPHILS NFR BLD AUTO: 2.8 10*3/MM3 (ref 1.7–7)
NEUTROPHILS NFR BLD AUTO: 42.5 % (ref 42.7–76)
NRBC BLD AUTO-RTO: 0 /100 WBC (ref 0–0.2)
PLATELET # BLD AUTO: 257 10*3/MM3 (ref 140–450)
PMV BLD AUTO: 9.9 FL (ref 6–12)
POTASSIUM SERPL-SCNC: 3.9 MMOL/L (ref 3.5–5.2)
PROT SERPL-MCNC: 7.6 G/DL (ref 6–8.5)
RBC # BLD AUTO: 5.87 10*6/MM3 (ref 4.14–5.8)
SODIUM SERPL-SCNC: 140 MMOL/L (ref 136–145)
TESTOST SERPL-MCNC: 392 NG/DL (ref 249–836)
WBC NRBC COR # BLD: 6.58 10*3/MM3 (ref 3.4–10.8)

## 2023-05-02 PROCEDURE — 85025 COMPLETE CBC W/AUTO DIFF WBC: CPT | Performed by: FAMILY MEDICINE

## 2023-05-02 PROCEDURE — 84403 ASSAY OF TOTAL TESTOSTERONE: CPT | Performed by: FAMILY MEDICINE

## 2023-05-02 PROCEDURE — 80053 COMPREHEN METABOLIC PANEL: CPT | Performed by: FAMILY MEDICINE

## 2023-05-09 ENCOUNTER — OFFICE VISIT (OUTPATIENT)
Dept: FAMILY MEDICINE CLINIC | Facility: CLINIC | Age: 33
End: 2023-05-09
Payer: COMMERCIAL

## 2023-05-09 VITALS
RESPIRATION RATE: 16 BRPM | BODY MASS INDEX: 49.44 KG/M2 | HEART RATE: 74 BPM | DIASTOLIC BLOOD PRESSURE: 84 MMHG | TEMPERATURE: 97.5 F | HEIGHT: 67 IN | SYSTOLIC BLOOD PRESSURE: 133 MMHG | OXYGEN SATURATION: 95 % | WEIGHT: 315 LBS

## 2023-05-09 DIAGNOSIS — E66.01 MORBID OBESITY: ICD-10-CM

## 2023-05-09 DIAGNOSIS — R79.89 LOW TESTOSTERONE IN MALE: ICD-10-CM

## 2023-05-09 DIAGNOSIS — R73.03 PREDIABETES: ICD-10-CM

## 2023-05-09 DIAGNOSIS — Z30.09 VASECTOMY EVALUATION: ICD-10-CM

## 2023-05-09 DIAGNOSIS — E78.2 MIXED HYPERLIPIDEMIA: Primary | ICD-10-CM

## 2023-05-09 PROCEDURE — 99214 OFFICE O/P EST MOD 30 MIN: CPT | Performed by: FAMILY MEDICINE

## 2023-05-09 RX ORDER — AMOXICILLIN 500 MG/1
500 CAPSULE ORAL 2 TIMES DAILY
COMMUNITY
End: 2023-05-09

## 2023-05-09 RX ORDER — TESTOSTERONE 20.25 MG/1.25G
2 GEL TOPICAL DAILY
Qty: 75 G | Refills: 1 | Status: SHIPPED | OUTPATIENT
Start: 2023-05-09

## 2023-05-09 NOTE — PROGRESS NOTES
Subjective   Chief Complaint   Patient presents with   • low testosterone   • Med Refill   • Hypertension   • Prediabetes     Juvenal Rossi is a 33 y.o. male.     Patient Care Team:  Susanna Myers MD as PCP - General (Family Medicine)    History of Present Illness  He is coming in today to follow-up on his medical problems and his medications and also discuss his recent blood work results.  We are addressing his hyperlipidemia, prediabetes, and low testosterone.  He has been on testosterone replacement therapy through AndroGel for the last few months.  He reports feeling overall much better physically.  He has more energy.  He is also trying to eat healthier and overall improve his lifestyle.  He has 3 children and would like to proceed with vasectomy, he needs every urology referral.  No other concerns are being reported today       The following portions of the patient's history were reviewed and updated as appropriate: allergies, current medications, past family history, past medical history, past social history, past surgical history and problem list.  Past Medical History:   Diagnosis Date   • Low testosterone in male    • Obesity    • Rupture of anterior cruciate ligament of right knee      Past Surgical History:   Procedure Laterality Date   • KNEE ACL RECONSTRUCTION Right    • KNEE ARTHROSCOPY W/ ACL RECONSTRUCTION      x2   • KNEE MENISCAL REPAIR Right    • MENISCECTOMY Left 2008     The patient has a family history of  Family History   Problem Relation Age of Onset   • Cancer Mother         Breast cancer   • Aneurysm Father      Social History     Socioeconomic History   • Marital status: Single   Tobacco Use   • Smoking status: Never   • Smokeless tobacco: Never   Vaping Use   • Vaping Use: Some days   Substance and Sexual Activity   • Alcohol use: Yes     Alcohol/week: 24.0 standard drinks     Types: 24 Cans of beer per week   • Drug use: Not Currently   • Sexual activity: Yes     Partners: Female  "      Review of Systems   Constitutional: Negative for activity change, fatigue and fever.   Respiratory: Negative for shortness of breath and wheezing.    Cardiovascular: Negative for chest pain, palpitations and leg swelling.   Musculoskeletal: Negative for arthralgias and back pain.   Skin: Negative for rash.   Neurological: Negative for tremors and headache.     Visit Vitals  /84 (BP Location: Left arm, Patient Position: Sitting, Cuff Size: Adult)   Pulse 74   Temp 97.5 °F (36.4 °C) (Infrared)   Resp 16   Ht 170.2 cm (67\")   Wt (!) 146 kg (321 lb)   SpO2 95%   BMI 50.28 kg/m²       Class 3 Severe Obesity (BMI >=40). Obesity-related health conditions include the following: none. Obesity is unchanged. BMI is is above average; BMI management plan is completed. We discussed portion control and increasing exercise.      Current Outpatient Medications:   •  albuterol sulfate  (90 Base) MCG/ACT inhaler, Inhale 2 puffs Every 6 (Six) Hours As Needed for Wheezing., Disp: 1 inhaler, Rfl: 0  •  Testosterone (AndroGel) 20.25 MG/1.25GM (1.62%) gel, Place 2 application on the skin as directed by provider Daily., Disp: 75 g, Rfl: 1    Objective   Physical Exam  Constitutional:       General: He is not in acute distress.     Appearance: Normal appearance. He is well-developed. He is not ill-appearing or diaphoretic.      Comments: Patient is in no distress, patient has normal voice and speech.  Normal respiratory effort.   HENT:      Head: Normocephalic and atraumatic.   Pulmonary:      Effort: Pulmonary effort is normal.   Musculoskeletal:      Cervical back: Normal range of motion and neck supple.   Neurological:      General: No focal deficit present.      Mental Status: He is alert and oriented to person, place, and time. Mental status is at baseline.   Psychiatric:         Mood and Affect: Mood normal.         FOLLOWING LABS WERE REVIEWED TODAY:  CMP        6/20/2022    17:18 7/8/2022    12:34 5/2/2023    08:09 "   CMP   Glucose 94   104   102     BUN 11   13   11     Creatinine 0.85   0.88   0.84     EGFR 118.4   117.2   118.1     Sodium 140   136   140     Potassium 4.0   4.2   3.9     Chloride 99   99   103     Calcium 10.2   8.9   9.5     Total Protein 8.2   7.2   7.6     Albumin 5.10   4.70   4.5     Globulin 3.1   2.5   3.1     Total Bilirubin 0.6   0.4   0.2     Alkaline Phosphatase 59   62   66     AST (SGOT) 39   35   34     ALT (SGPT) 67   61   56     Albumin/Globulin Ratio 1.6   1.9   1.5     BUN/Creatinine Ratio 12.9   14.8   13.1     Anion Gap 12.8   10.0   11.0       CBC        6/20/2022    17:18 7/8/2022    12:34 5/2/2023    08:09   CBC   WBC 6.06   6.83   6.58     RBC 5.89   5.49   5.87     Hemoglobin 16.2   15.1   15.9     Hematocrit 48.0   43.8   46.4     MCV 81.5   79.8   79.0     MCH 27.5   27.5   27.1     MCHC 33.8   34.5   34.3     RDW 13.4   13.1   13.2     Platelets 239   225   257       Lipid Panel        6/20/2022    17:18 7/8/2022    12:34   Lipid Panel   Total Cholesterol 207   187     Triglycerides 275   213     HDL Cholesterol 42   37     VLDL Cholesterol 48   37     LDL Cholesterol  117   113     LDL/HDL Ratio 2.62   2.90       Most Recent A1C        7/8/2022    12:34   HGBA1C Most Recent   Hemoglobin A1C 6.0         Assessment & Plan   Diagnoses and all orders for this visit:    1. Mixed hyperlipidemia (Primary)  -     Comprehensive Metabolic Panel  -     Lipid Panel    2. Low testosterone in male  -     Comprehensive Metabolic Panel  -     CBC Auto Differential  -     Testosterone; Future  -     Testosterone (AndroGel) 20.25 MG/1.25GM (1.62%) gel; Place 2 application on the skin as directed by provider Daily.  Dispense: 75 g; Refill: 1    3. Prediabetes  -     Hemoglobin A1c    4. Vasectomy evaluation  -     Ambulatory Referral to Urology    5. Morbid obesity    6. BMI 50.0-59.9, adult      I reviewed his medical problems and his medications.  I also reviewed and discussed the blood work  results with the patient.  His testosterone level is within normal limits now.  His red blood cell count is very slightly elevated.  I discussed with the patient that this might be due to being on testosterone and this will need to be closely monitored and live repeat blood work will be done in 3 months.  Healthy lifestyle was discussed and reinforced.  He definitely would benefit from the weight loss and lowering his BMI to improve control of his medical issues and decrease the cardiovascular risk.  Patient is interested in getting vasectomy and he was given referral to see a urologist      Return in about 3 months (around 8/9/2023) for Next scheduled follow up.    Requested Prescriptions     Signed Prescriptions Disp Refills   • Testosterone (AndroGel) 20.25 MG/1.25GM (1.62%) gel 75 g 1     Sig: Place 2 application on the skin as directed by provider Daily.

## 2023-07-26 ENCOUNTER — TELEPHONE (OUTPATIENT)
Dept: FAMILY MEDICINE CLINIC | Facility: CLINIC | Age: 33
End: 2023-07-26

## 2023-07-26 NOTE — TELEPHONE ENCOUNTER
Caller: [employer]    Best call back number: [phone]     What form or medical record are you requesting: QUESTION 4 ON FMLA PAPERWORK NEEDS TO STATE CONDITION DIAGNOSED    Who is requesting this form or medical record from you: HR DEPARTMENT    How would you like to receive the form or medical records (pick-up, mail, fax):   FAX   [phone]

## 2023-09-13 DIAGNOSIS — R79.89 LOW TESTOSTERONE IN MALE: ICD-10-CM

## 2023-09-13 RX ORDER — TESTOSTERONE 20.25 MG/1.25G
2 GEL TOPICAL DAILY
Qty: 75 G | Refills: 0 | OUTPATIENT
Start: 2023-09-13

## 2023-09-22 ENCOUNTER — OFFICE VISIT (OUTPATIENT)
Dept: FAMILY MEDICINE CLINIC | Facility: CLINIC | Age: 33
End: 2023-09-22
Payer: COMMERCIAL

## 2023-09-22 VITALS
WEIGHT: 315 LBS | SYSTOLIC BLOOD PRESSURE: 120 MMHG | BODY MASS INDEX: 49.44 KG/M2 | HEART RATE: 84 BPM | HEIGHT: 67 IN | TEMPERATURE: 97.5 F | RESPIRATION RATE: 16 BRPM | DIASTOLIC BLOOD PRESSURE: 75 MMHG | OXYGEN SATURATION: 95 %

## 2023-09-22 DIAGNOSIS — R73.03 PREDIABETES: Primary | ICD-10-CM

## 2023-09-22 DIAGNOSIS — R14.0 ABDOMINAL BLOATING: ICD-10-CM

## 2023-09-22 DIAGNOSIS — R79.89 LOW TESTOSTERONE IN MALE: ICD-10-CM

## 2023-09-22 DIAGNOSIS — E66.01 MORBID OBESITY: ICD-10-CM

## 2023-09-22 PROCEDURE — 99214 OFFICE O/P EST MOD 30 MIN: CPT | Performed by: FAMILY MEDICINE

## 2023-09-22 RX ORDER — SEMAGLUTIDE 0.25 MG/.5ML
0.25 INJECTION, SOLUTION SUBCUTANEOUS WEEKLY
Qty: 2 ML | Refills: 0 | Status: SHIPPED | OUTPATIENT
Start: 2023-09-22

## 2023-09-22 RX ORDER — TESTOSTERONE 20.25 MG/1.25G
2 GEL TOPICAL DAILY
Qty: 75 G | Refills: 0 | Status: SHIPPED | OUTPATIENT
Start: 2023-09-22

## 2023-09-22 NOTE — PROGRESS NOTES
Subjective   Chief Complaint   Patient presents with    Medication refills    Prediabetes    Low testosterone    GI Problem    Obesity     Juvenal Rossi is a 33 y.o. male.     Patient Care Team:  Susanna Myers MD as PCP - General (Family Medicine)    History of Present Illness  He is coming in today to follow-up on his medical issues and his medicines.  He was started on testosterone replacement therapy earlier this year after 2 separate readings off testosterone levels were low.  Patient tells me that couple of months ago he ran out of the prescription due to his insurance not covering it and simply being too expensive.  He wanted to try how he would do without, later on he started noticing more fatigue and he would like to restart testosterone.  He also has been dealing with some abdominal bloating and distention after he eats.  This has been going on for some time.  No vomiting or diarrhea reported.  He tells me that in the past he was tested for sleep apnea twice and the results were almost inconclusive.  Then he decided to buy his own CPAP machine couple of years ago and has been using it since then and this makes a huge difference and he cannot really sleep without it.  He is also worried about his weight.  He has been trying to lose weight for some time, but unsuccessfully.     The following portions of the patient's history were reviewed and updated as appropriate: allergies, current medications, past family history, past medical history, past social history, past surgical history, and problem list.  Past Medical History:   Diagnosis Date    Low testosterone in male     Obesity     Rupture of anterior cruciate ligament of right knee      Past Surgical History:   Procedure Laterality Date    KNEE ACL RECONSTRUCTION Right     KNEE ARTHROSCOPY W/ ACL RECONSTRUCTION      x2    KNEE MENISCAL REPAIR Right     MENISCECTOMY Left 2008     The patient has a family history of  Family History   Problem Relation  "Age of Onset    Cancer Mother         Breast cancer    Aneurysm Father      Social History     Socioeconomic History    Marital status: Single   Tobacco Use    Smoking status: Never    Smokeless tobacco: Never   Vaping Use    Vaping Use: Some days   Substance and Sexual Activity    Alcohol use: Yes     Alcohol/week: 24.0 standard drinks     Types: 24 Cans of beer per week    Drug use: Not Currently    Sexual activity: Yes     Partners: Female       Review of Systems   Constitutional:  Positive for fatigue. Negative for activity change and fever.   Respiratory:  Negative for shortness of breath and wheezing.    Cardiovascular:  Negative for chest pain, palpitations and leg swelling.   Gastrointestinal:  Positive for abdominal distention and abdominal pain. Negative for nausea and vomiting.   Musculoskeletal:  Negative for arthralgias and back pain.   Skin:  Negative for rash.   Neurological:  Negative for tremors and headache.   Visit Vitals  /75 (BP Location: Left arm, Patient Position: Sitting, Cuff Size: Adult)   Pulse 84   Temp 97.5 °F (36.4 °C) (Infrared)   Resp 16   Ht 170.2 cm (67.01\")   Wt (!) 144 kg (318 lb)   SpO2 95%   BMI 49.79 kg/m²              Current Outpatient Medications:     albuterol sulfate  (90 Base) MCG/ACT inhaler, Inhale 2 puffs Every 6 (Six) Hours As Needed for Wheezing., Disp: 1 inhaler, Rfl: 0    Testosterone (AndroGel) 20.25 MG/1.25GM (1.62%) gel, Place 2 application  on the skin as directed by provider Daily., Disp: 75 g, Rfl: 0    Semaglutide-Weight Management (Wegovy) 0.25 MG/0.5ML solution auto-injector, Inject 0.25 mg under the skin into the appropriate area as directed 1 (One) Time Per Week., Disp: 2 mL, Rfl: 0    Objective   Physical Exam  Constitutional:       General: He is not in acute distress.     Appearance: Normal appearance. He is well-developed. He is not ill-appearing or diaphoretic.      Comments: Patient is in no distress, patient has normal voice and " speech.  Normal respiratory effort.   HENT:      Head: Normocephalic and atraumatic.   Pulmonary:      Effort: Pulmonary effort is normal.   Musculoskeletal:      Cervical back: Normal range of motion and neck supple.   Neurological:      General: No focal deficit present.      Mental Status: He is alert and oriented to person, place, and time. Mental status is at baseline.   Psychiatric:         Mood and Affect: Mood normal.         FOLLOWING LABS WERE REVIEWED TODAY:  CMP          5/2/2023    08:09   CMP   Glucose 102    BUN 11    Creatinine 0.84    EGFR 118.1    Sodium 140    Potassium 3.9    Chloride 103    Calcium 9.5    Total Protein 7.6    Albumin 4.5    Globulin 3.1    Total Bilirubin 0.2    Alkaline Phosphatase 66    AST (SGOT) 34    ALT (SGPT) 56    Albumin/Globulin Ratio 1.5    BUN/Creatinine Ratio 13.1    Anion Gap 11.0        Assessment & Plan   Diagnoses and all orders for this visit:    1. Prediabetes (Primary)  -     Comprehensive Metabolic Panel  -     Hemoglobin A1c  -     Lipid Panel    2. Low testosterone in male  -     Testosterone (AndroGel) 20.25 MG/1.25GM (1.62%) gel; Place 2 application  on the skin as directed by provider Daily.  Dispense: 75 g; Refill: 0  -     Comprehensive Metabolic Panel  -     Lipid Panel  -     CBC Auto Differential  -     Testosterone; Future    3. Morbid obesity  -     Semaglutide-Weight Management (Wegovy) 0.25 MG/0.5ML solution auto-injector; Inject 0.25 mg under the skin into the appropriate area as directed 1 (One) Time Per Week.  Dispense: 2 mL; Refill: 0    4. BMI 45.0-49.9, adult  -     Semaglutide-Weight Management (Wegovy) 0.25 MG/0.5ML solution auto-injector; Inject 0.25 mg under the skin into the appropriate area as directed 1 (One) Time Per Week.  Dispense: 2 mL; Refill: 0    5. Abdominal bloating  -     US Gallbladder      I will be getting fasting blood work.  Patient definitely would benefit from weight loss and lowering his BMI to decrease the  cardiovascular risk.  We discussed GLP-1 agonist category of the medications and patient wants to proceed.  Prescription for Wegovy was given and possible side effects were discussed.  I will be also getting right upper quadrant ultrasound to rule out gallbladder issues as a cause of his abdominal bloating.      Return in about 3 months (around 12/22/2023) for Next scheduled follow up.    Requested Prescriptions     Signed Prescriptions Disp Refills    Semaglutide-Weight Management (Wegovy) 0.25 MG/0.5ML solution auto-injector 2 mL 0     Sig: Inject 0.25 mg under the skin into the appropriate area as directed 1 (One) Time Per Week.    Testosterone (AndroGel) 20.25 MG/1.25GM (1.62%) gel 75 g 0     Sig: Place 2 application  on the skin as directed by provider Daily.

## 2023-09-26 ENCOUNTER — LAB (OUTPATIENT)
Dept: FAMILY MEDICINE CLINIC | Facility: CLINIC | Age: 33
End: 2023-09-26
Payer: COMMERCIAL

## 2023-09-26 DIAGNOSIS — R79.89 LOW TESTOSTERONE IN MALE: ICD-10-CM

## 2023-09-26 LAB
ALBUMIN SERPL-MCNC: 4.6 G/DL (ref 3.5–5.2)
ALBUMIN/GLOB SERPL: 1.5 G/DL
ALP SERPL-CCNC: 60 U/L (ref 39–117)
ALT SERPL W P-5'-P-CCNC: 57 U/L (ref 1–41)
ANION GAP SERPL CALCULATED.3IONS-SCNC: 13.5 MMOL/L (ref 5–15)
AST SERPL-CCNC: 38 U/L (ref 1–40)
BASOPHILS # BLD AUTO: 0.05 10*3/MM3 (ref 0–0.2)
BASOPHILS NFR BLD AUTO: 0.7 % (ref 0–1.5)
BILIRUB SERPL-MCNC: 0.4 MG/DL (ref 0–1.2)
BUN SERPL-MCNC: 12 MG/DL (ref 6–20)
BUN/CREAT SERPL: 13.3 (ref 7–25)
CALCIUM SPEC-SCNC: 9.5 MG/DL (ref 8.6–10.5)
CHLORIDE SERPL-SCNC: 101 MMOL/L (ref 98–107)
CHOLEST SERPL-MCNC: 198 MG/DL (ref 0–200)
CO2 SERPL-SCNC: 24.5 MMOL/L (ref 22–29)
CREAT SERPL-MCNC: 0.9 MG/DL (ref 0.76–1.27)
DEPRECATED RDW RBC AUTO: 41.2 FL (ref 37–54)
EGFRCR SERPLBLD CKD-EPI 2021: 115.7 ML/MIN/1.73
EOSINOPHIL # BLD AUTO: 0.11 10*3/MM3 (ref 0–0.4)
EOSINOPHIL NFR BLD AUTO: 1.6 % (ref 0.3–6.2)
ERYTHROCYTE [DISTWIDTH] IN BLOOD BY AUTOMATED COUNT: 14.1 % (ref 12.3–15.4)
GLOBULIN UR ELPH-MCNC: 3 GM/DL
GLUCOSE SERPL-MCNC: 113 MG/DL (ref 65–99)
HBA1C MFR BLD: 6.5 % (ref 4.8–5.6)
HCT VFR BLD AUTO: 48.3 % (ref 37.5–51)
HDLC SERPL-MCNC: 36 MG/DL (ref 40–60)
HGB BLD-MCNC: 16 G/DL (ref 13–17.7)
IMM GRANULOCYTES # BLD AUTO: 0.07 10*3/MM3 (ref 0–0.05)
IMM GRANULOCYTES NFR BLD AUTO: 1 % (ref 0–0.5)
LDLC SERPL CALC-MCNC: 114 MG/DL (ref 0–100)
LDLC/HDLC SERPL: 2.98 {RATIO}
LYMPHOCYTES # BLD AUTO: 2.63 10*3/MM3 (ref 0.7–3.1)
LYMPHOCYTES NFR BLD AUTO: 39.2 % (ref 19.6–45.3)
MCH RBC QN AUTO: 26.8 PG (ref 26.6–33)
MCHC RBC AUTO-ENTMCNC: 33.1 G/DL (ref 31.5–35.7)
MCV RBC AUTO: 80.9 FL (ref 79–97)
MONOCYTES # BLD AUTO: 0.65 10*3/MM3 (ref 0.1–0.9)
MONOCYTES NFR BLD AUTO: 9.7 % (ref 5–12)
NEUTROPHILS NFR BLD AUTO: 3.2 10*3/MM3 (ref 1.7–7)
NEUTROPHILS NFR BLD AUTO: 47.8 % (ref 42.7–76)
NRBC BLD AUTO-RTO: 0 /100 WBC (ref 0–0.2)
PLATELET # BLD AUTO: 238 10*3/MM3 (ref 140–450)
PMV BLD AUTO: 9.8 FL (ref 6–12)
POTASSIUM SERPL-SCNC: 3.8 MMOL/L (ref 3.5–5.2)
PROT SERPL-MCNC: 7.6 G/DL (ref 6–8.5)
RBC # BLD AUTO: 5.97 10*6/MM3 (ref 4.14–5.8)
SODIUM SERPL-SCNC: 139 MMOL/L (ref 136–145)
TESTOST SERPL-MCNC: 260 NG/DL (ref 249–836)
TRIGL SERPL-MCNC: 273 MG/DL (ref 0–150)
VLDLC SERPL-MCNC: 48 MG/DL (ref 5–40)
WBC NRBC COR # BLD: 6.71 10*3/MM3 (ref 3.4–10.8)

## 2023-09-26 PROCEDURE — 80053 COMPREHEN METABOLIC PANEL: CPT | Performed by: FAMILY MEDICINE

## 2023-09-26 PROCEDURE — 83036 HEMOGLOBIN GLYCOSYLATED A1C: CPT | Performed by: FAMILY MEDICINE

## 2023-09-26 PROCEDURE — 84403 ASSAY OF TOTAL TESTOSTERONE: CPT | Performed by: FAMILY MEDICINE

## 2023-09-26 PROCEDURE — 85025 COMPLETE CBC W/AUTO DIFF WBC: CPT | Performed by: FAMILY MEDICINE

## 2023-09-26 PROCEDURE — 80061 LIPID PANEL: CPT | Performed by: FAMILY MEDICINE

## 2023-09-28 ENCOUNTER — TELEPHONE (OUTPATIENT)
Dept: FAMILY MEDICINE CLINIC | Facility: CLINIC | Age: 33
End: 2023-09-28

## 2023-09-28 NOTE — TELEPHONE ENCOUNTER
Caller: Juvenal Rossi    Relationship to patient: Self    Best call back number: [phone]    Patient is needing: PATIENT IS NEEDING A PRIOR AUTHORIZATION TO BE SENT TO HIS PHARMACY FOR A MEDICATION       Semaglutide-Weight Management (Wegovy) 0.25 MG/0.5ML solution auto-injector     [id number] [place] [address] [address] AT SEC OF [facility] [address] - [phone]  - [phone] FX

## 2023-10-03 ENCOUNTER — OFFICE VISIT (OUTPATIENT)
Dept: FAMILY MEDICINE CLINIC | Facility: CLINIC | Age: 33
End: 2023-10-03
Payer: COMMERCIAL

## 2023-10-03 VITALS
DIASTOLIC BLOOD PRESSURE: 83 MMHG | OXYGEN SATURATION: 95 % | WEIGHT: 315 LBS | RESPIRATION RATE: 16 BRPM | SYSTOLIC BLOOD PRESSURE: 138 MMHG | TEMPERATURE: 97.5 F | BODY MASS INDEX: 49.44 KG/M2 | HEIGHT: 67 IN | HEART RATE: 65 BPM

## 2023-10-03 DIAGNOSIS — E11.9 TYPE 2 DIABETES MELLITUS WITHOUT COMPLICATION, WITHOUT LONG-TERM CURRENT USE OF INSULIN: Primary | ICD-10-CM

## 2023-10-03 DIAGNOSIS — E78.2 MIXED HYPERLIPIDEMIA: ICD-10-CM

## 2023-10-03 DIAGNOSIS — E66.01 MORBID OBESITY: ICD-10-CM

## 2023-10-03 PROBLEM — R73.03 PREDIABETES: Status: RESOLVED | Noted: 2022-06-24 | Resolved: 2023-10-03

## 2023-10-03 RX ORDER — SEMAGLUTIDE 1.34 MG/ML
0.25 INJECTION, SOLUTION SUBCUTANEOUS WEEKLY
Qty: 1.5 ML | Refills: 0 | Status: SHIPPED | OUTPATIENT
Start: 2023-10-03

## 2023-10-03 NOTE — PROGRESS NOTES
Subjective   Chief Complaint   Patient presents with    Discuss Labs    Diabetes    Hyperlipidemia    Obesity     Juvenal Rossi is a 33 y.o. male.     Patient Care Team:  Susanna Myers MD as PCP - General (Family Medicine)    History of Present Illness  He is coming in today as he wants to follow-up on his recent blood work results.  His blood sugar has been elevated in prediabetes range for some time and his recent blood work shows that it got worse and he is now early diabetic with elevated hemoglobin A1c.  He has a family history of diabetes in his mom, he is concerned about the progression of his blood sugar.  He is also concerned about his weight, he has been trying to improve his lifestyle and improve his diet.  His fasting lipid panel is also elevated.  No polyuria or polydipsia reported.     The following portions of the patient's history were reviewed and updated as appropriate: allergies, current medications, past family history, past medical history, past social history, past surgical history, and problem list.  Past Medical History:   Diagnosis Date    Low testosterone in male     Obesity     Rupture of anterior cruciate ligament of right knee      Past Surgical History:   Procedure Laterality Date    KNEE ACL RECONSTRUCTION Right     KNEE ARTHROSCOPY W/ ACL RECONSTRUCTION      x2    KNEE MENISCAL REPAIR Right     MENISCECTOMY Left 2008     The patient has a family history of  Family History   Problem Relation Age of Onset    Cancer Mother         Breast cancer    Aneurysm Father      Social History     Socioeconomic History    Marital status: Single   Tobacco Use    Smoking status: Never    Smokeless tobacco: Never   Vaping Use    Vaping Use: Some days   Substance and Sexual Activity    Alcohol use: Yes     Alcohol/week: 24.0 standard drinks     Types: 24 Cans of beer per week    Drug use: Not Currently    Sexual activity: Yes     Partners: Female       Review of Systems   Constitutional:   "Negative for activity change, fatigue and fever.   Respiratory:  Negative for shortness of breath and wheezing.    Cardiovascular:  Negative for chest pain, palpitations and leg swelling.   Endocrine: Negative for polydipsia and polyphagia.   Musculoskeletal:  Negative for arthralgias and back pain.   Skin:  Negative for rash.   Neurological:  Negative for tremors and headache.   Visit Vitals  /83 (BP Location: Left arm, Patient Position: Sitting, Cuff Size: Adult)   Pulse 65   Temp 97.5 °F (36.4 °C) (Infrared)   Resp 16   Ht 170.2 cm (67.01\")   Wt (!) 144 kg (318 lb)   SpO2 95%   BMI 49.79 kg/m²              Current Outpatient Medications:     albuterol sulfate  (90 Base) MCG/ACT inhaler, Inhale 2 puffs Every 6 (Six) Hours As Needed for Wheezing., Disp: 1 inhaler, Rfl: 0    Testosterone (AndroGel) 20.25 MG/1.25GM (1.62%) gel, Place 2 application  on the skin as directed by provider Daily., Disp: 75 g, Rfl: 0    Semaglutide,0.25 or 0.5MG/DOS, (Ozempic, 0.25 or 0.5 MG/DOSE,) 2 MG/1.5ML solution pen-injector, Inject 0.25 mg under the skin into the appropriate area as directed 1 (One) Time Per Week., Disp: 1.5 mL, Rfl: 0    Objective   Physical Exam  Constitutional:       General: He is not in acute distress.     Appearance: Normal appearance. He is well-developed. He is not ill-appearing or diaphoretic.      Comments: Patient is in no distress, patient has normal voice and speech.  Normal respiratory effort.   HENT:      Head: Normocephalic and atraumatic.   Pulmonary:      Effort: Pulmonary effort is normal.   Musculoskeletal:      Cervical back: Normal range of motion and neck supple.   Neurological:      General: No focal deficit present.      Mental Status: He is alert and oriented to person, place, and time. Mental status is at baseline.   Psychiatric:         Mood and Affect: Mood normal.            FOLLOWING LABS WERE REVIEWED TODAY:  CMP          5/2/2023    08:09 9/26/2023    08:12   CMP "   Glucose 102  113    BUN 11  12    Creatinine 0.84  0.90    EGFR 118.1  115.7    Sodium 140  139    Potassium 3.9  3.8    Chloride 103  101    Calcium 9.5  9.5    Total Protein 7.6  7.6    Albumin 4.5  4.6    Globulin 3.1  3.0    Total Bilirubin 0.2  0.4    Alkaline Phosphatase 66  60    AST (SGOT) 34  38    ALT (SGPT) 56  57    Albumin/Globulin Ratio 1.5  1.5    BUN/Creatinine Ratio 13.1  13.3    Anion Gap 11.0  13.5      Lipid Panel          9/26/2023    08:12   Lipid Panel   Total Cholesterol 198    Triglycerides 273    HDL Cholesterol 36    VLDL Cholesterol 48    LDL Cholesterol  114    LDL/HDL Ratio 2.98      Most Recent A1C          9/26/2023    08:12   HGBA1C Most Recent   Hemoglobin A1C 6.50          Assessment & Plan   Diagnoses and all orders for this visit:    1. Type 2 diabetes mellitus without complication, without long-term current use of insulin (Primary)  -     Semaglutide,0.25 or 0.5MG/DOS, (Ozempic, 0.25 or 0.5 MG/DOSE,) 2 MG/1.5ML solution pen-injector; Inject 0.25 mg under the skin into the appropriate area as directed 1 (One) Time Per Week.  Dispense: 1.5 mL; Refill: 0  -     Hemoglobin A1c  -     Microalbumin / Creatinine Urine Ratio - Urine, Clean Catch  -     Comprehensive Metabolic Panel  -     Lipid Panel    2. Morbid obesity    3. BMI 50.0-59.9, adult    4. Mixed hyperlipidemia  -     Comprehensive Metabolic Panel  -     Lipid Panel      I reviewed and discussed with the patient his recent blood work results.  He definitely would benefit from weight loss and lowering his BMI and we also stressed importance of blood sugar control.  I will be starting him on Ozempic.  Possible side effects of medications were discussed.  He will continue to work on diet and increase physical activity and weight loss.      Return in about 3 months (around 1/3/2024) for Next scheduled follow up.    Requested Prescriptions     Signed Prescriptions Disp Refills    Semaglutide,0.25 or 0.5MG/DOS, (Ozempic, 0.25  or 0.5 MG/DOSE,) 2 MG/1.5ML solution pen-injector 1.5 mL 0     Sig: Inject 0.25 mg under the skin into the appropriate area as directed 1 (One) Time Per Week.

## 2023-10-09 ENCOUNTER — TELEPHONE (OUTPATIENT)
Dept: FAMILY MEDICINE CLINIC | Facility: CLINIC | Age: 33
End: 2023-10-09

## 2023-10-09 NOTE — TELEPHONE ENCOUNTER
I called and spoke with Yesica @ [facility] and she will re fax the forms as we have not received them

## 2023-10-09 NOTE — TELEPHONE ENCOUNTER
Caller: JAGRUTI    Relationship: Other    Best call back number: [phone]      JAGRUTI CALLED, WITH [facility], IN REGARDS TO A PRIOR AUTHORIZATION ON PATIENT'S WEGOVY MEDICATION.    THEY ENDED UP SENDING US MORE FORMS, 10/3/23 AND ARE FOLLOWING UP ON THIS.    PLEASE ADVISE

## 2023-10-10 ENCOUNTER — TELEPHONE (OUTPATIENT)
Dept: FAMILY MEDICINE CLINIC | Facility: CLINIC | Age: 33
End: 2023-10-10

## 2023-10-10 NOTE — TELEPHONE ENCOUNTER
Caller: Juvenal Rossi    Relationship: Self    Best call back number: [phone]     What medication are you requesting: SEMAGLUTIDE SOLN PEN INJECTION      If a prescription is needed, what is your preferred pharmacy and phone number: [facility] #[id number], [place] - [address] AT SEC OF [facility] [address] [facility] [address]  - [phone]  - [phone] FX     Additional notes: PATIENT STATED HIS INSURANCE WILL NOT COVER THE OZEMPIC AND HAS ASKED PATIENT TO TRY  SEMAGLUTIDE SOLN PEN INJECTION FIRST    PLEASE ADVISE

## 2023-10-11 ENCOUNTER — TELEPHONE (OUTPATIENT)
Dept: FAMILY MEDICINE CLINIC | Facility: CLINIC | Age: 33
End: 2023-10-11

## 2023-10-11 NOTE — TELEPHONE ENCOUNTER
Semaglutide is available under the trade name of Ozempic or Wegovy. It is not available in generic form. Which one does insurance cover?

## 2023-10-11 NOTE — TELEPHONE ENCOUNTER
Caller: Juvenal Rossi    Relationship to patient: Self    Best call back number: [phone]    Patient is needing:     INSURANCE COMPANY TOLD PATIENT THAT THE OZEMPIC WILL BE COVERED UNDER HIS PLAN AS IT IS FOR DIABETES AND NOT WEIGHT LOSS. HOWEVER THEY ARE REQUIRING A PA FOR       Semaglutide,0.25 or 0.5MG/DOS, (Ozempic, 0.25 or 0.5 MG/DOSE,) 2 MG/1.5ML solution pen-injector       HE NO LONGER WANTS THE WEGOVY.

## 2023-10-11 NOTE — TELEPHONE ENCOUNTER
PATIENT CALLED ABOUT STATUS OF THIS PRESCRIPTION. HE STATES THAT HE STILL WANTS THE MEDICINE SENT TO [facility]. HE STATES THE WEGOVY HAS BEEN APPROV

## 2023-10-11 NOTE — TELEPHONE ENCOUNTER
PATIENT CALLED STATED THE MEDICATION HAS BEEN APPROVED BY THE INSURANCE COMPANY. DR CHAN NEEDS TO CONTACT THE PHARMACY AND TELL THEM THE INSURANCE HAS BEEN APPROVED.  PLEASE ADVISE   No

## 2023-10-13 NOTE — TELEPHONE ENCOUNTER
I called the patient to let him know his Insurance company denied Ozempic/ The reasoning is patient has to try Metformin for 30 days and have documentation of failure before Ozempic can be considered . Patient wants to know if you think Metformin is okay for him to take as he is worried about damaging his Kidneys

## 2023-10-14 NOTE — TELEPHONE ENCOUNTER
Metformin is considered a first line treatment for diabetes/prediabetes and we can certainly try it. Let me know how he wants to proceed. Thank you.

## 2023-10-16 NOTE — TELEPHONE ENCOUNTER
I called the patient and he would;d like the Metformin  called into [facility] on [address]

## 2023-10-17 RX ORDER — METFORMIN HYDROCHLORIDE 500 MG/1
500 TABLET, EXTENDED RELEASE ORAL
Qty: 30 TABLET | Refills: 2 | Status: SHIPPED | OUTPATIENT
Start: 2023-10-17

## 2023-10-25 ENCOUNTER — OFFICE VISIT (OUTPATIENT)
Dept: FAMILY MEDICINE CLINIC | Facility: CLINIC | Age: 33
End: 2023-10-25
Payer: COMMERCIAL

## 2023-10-25 VITALS
DIASTOLIC BLOOD PRESSURE: 83 MMHG | SYSTOLIC BLOOD PRESSURE: 123 MMHG | BODY MASS INDEX: 48.97 KG/M2 | HEIGHT: 67 IN | HEART RATE: 68 BPM | WEIGHT: 312 LBS | RESPIRATION RATE: 16 BRPM | TEMPERATURE: 97.5 F | OXYGEN SATURATION: 95 %

## 2023-10-25 DIAGNOSIS — E66.01 MORBID OBESITY: ICD-10-CM

## 2023-10-25 DIAGNOSIS — E11.9 TYPE 2 DIABETES MELLITUS WITHOUT COMPLICATION, WITHOUT LONG-TERM CURRENT USE OF INSULIN: Primary | ICD-10-CM

## 2023-10-25 DIAGNOSIS — E78.2 MIXED HYPERLIPIDEMIA: ICD-10-CM

## 2023-10-25 DIAGNOSIS — T88.7XXA MEDICATION SIDE EFFECTS: ICD-10-CM

## 2023-10-25 RX ORDER — TIRZEPATIDE 5 MG/.5ML
5 INJECTION, SOLUTION SUBCUTANEOUS WEEKLY
Qty: 2 ML | Refills: 0 | Status: SHIPPED | OUTPATIENT
Start: 2023-10-25

## 2023-10-25 RX ORDER — BLOOD-GLUCOSE METER
1 EACH MISCELLANEOUS DAILY
Qty: 1 KIT | Refills: 0 | Status: SHIPPED | OUTPATIENT
Start: 2023-10-25

## 2023-10-25 RX ORDER — BLOOD SUGAR DIAGNOSTIC
1 STRIP MISCELLANEOUS DAILY
Qty: 100 EACH | Refills: 0 | Status: SHIPPED | OUTPATIENT
Start: 2023-10-25

## 2023-10-25 RX ORDER — LANCETS
1 EACH MISCELLANEOUS DAILY
Qty: 100 EACH | Refills: 0 | Status: SHIPPED | OUTPATIENT
Start: 2023-10-25

## 2023-10-25 NOTE — PROGRESS NOTES
Subjective   Chief Complaint   Patient presents with    Discuss medication issue    Diabetes    Hyperlipidemia     Juvenal Rossi is a 33 y.o. male.     Patient Care Team:  Susanna Myers MD as PCP - General (Family Medicine)    History of Present Illness  He is coming in today to follow-up on his medical issues and new medication.  He was started on metformin in the recent past and he tells me that he had to discontinue the medication after couple of days due to side effects which included abdominal pain, cramping, diarrhea, he also experienced some chest tightness and difficulty breathing.  All these symptoms have resolved since he discontinued metformin.  His recent blood work also showed elevated fasting lipid panel, he has been trying to work on diet and also trying to lose weight for some time.       The following portions of the patient's history were reviewed and updated as appropriate: allergies, current medications, past family history, past medical history, past social history, past surgical history, and problem list.  Past Medical History:   Diagnosis Date    Low testosterone in male     Obesity     Rupture of anterior cruciate ligament of right knee      Past Surgical History:   Procedure Laterality Date    KNEE ACL RECONSTRUCTION Right     KNEE ARTHROSCOPY W/ ACL RECONSTRUCTION      x2    KNEE MENISCAL REPAIR Right     MENISCECTOMY Left 2008     The patient has a family history of  Family History   Problem Relation Age of Onset    Cancer Mother         Breast cancer    Aneurysm Father      Social History     Socioeconomic History    Marital status: Single   Tobacco Use    Smoking status: Never    Smokeless tobacco: Never   Vaping Use    Vaping Use: Some days   Substance and Sexual Activity    Alcohol use: Yes     Alcohol/week: 24.0 standard drinks of alcohol     Types: 24 Cans of beer per week    Drug use: Not Currently    Sexual activity: Yes     Partners: Female       Review of Systems  "  Constitutional:  Negative for activity change, fatigue and fever.   Respiratory:  Negative for shortness of breath and wheezing.    Cardiovascular:  Negative for chest pain, palpitations and leg swelling.   Musculoskeletal:  Negative for arthralgias and back pain.   Skin:  Negative for rash.   Neurological:  Negative for tremors and headache.     Visit Vitals  /83 (BP Location: Left arm, Patient Position: Sitting, Cuff Size: Adult)   Pulse 68   Temp 97.5 °F (36.4 °C) (Infrared)   Resp 16   Ht 170.2 cm (67\")   Wt (!) 142 kg (312 lb)   SpO2 95%   BMI 48.87 kg/m²              Current Outpatient Medications:     albuterol sulfate  (90 Base) MCG/ACT inhaler, Inhale 2 puffs Every 6 (Six) Hours As Needed for Wheezing., Disp: 1 inhaler, Rfl: 0    Testosterone (AndroGel) 20.25 MG/1.25GM (1.62%) gel, Place 2 application  on the skin as directed by provider Daily., Disp: 75 g, Rfl: 0    Blood Glucose Monitoring Suppl (Accu-Chek Guide) w/Device kit, Use 1 Device Daily., Disp: 1 kit, Rfl: 0    glucose blood (Accu-Chek Guide) test strip, 1 each by Other route Daily., Disp: 100 each, Rfl: 0    Lancets (accu-chek multiclix) lancets, 1 each by Other route Daily., Disp: 100 each, Rfl: 0    Tirzepatide (Mounjaro) 5 MG/0.5ML solution pen-injector, Inject 0.5 mL under the skin into the appropriate area as directed 1 (One) Time Per Week., Disp: 2 mL, Rfl: 0    Objective   Physical Exam  Constitutional:       General: He is not in acute distress.     Appearance: Normal appearance. He is well-developed. He is not ill-appearing or diaphoretic.      Comments: Patient is in no distress, patient has normal voice and speech.  Normal respiratory effort.   HENT:      Head: Normocephalic and atraumatic.   Pulmonary:      Effort: Pulmonary effort is normal.   Musculoskeletal:      Cervical back: Normal range of motion and neck supple.   Neurological:      General: No focal deficit present.      Mental Status: He is alert and oriented " to person, place, and time. Mental status is at baseline.   Psychiatric:         Mood and Affect: Mood normal.         FOLLOWING LABS WERE REVIEWED TODAY:  CMP          5/2/2023    08:09 9/26/2023    08:12   CMP   Glucose 102  113    BUN 11  12    Creatinine 0.84  0.90    EGFR 118.1  115.7    Sodium 140  139    Potassium 3.9  3.8    Chloride 103  101    Calcium 9.5  9.5    Total Protein 7.6  7.6    Albumin 4.5  4.6    Globulin 3.1  3.0    Total Bilirubin 0.2  0.4    Alkaline Phosphatase 66  60    AST (SGOT) 34  38    ALT (SGPT) 56  57    Albumin/Globulin Ratio 1.5  1.5    BUN/Creatinine Ratio 13.1  13.3    Anion Gap 11.0  13.5      Most Recent A1C          9/26/2023    08:12   HGBA1C Most Recent   Hemoglobin A1C 6.50      Lipid Panel          9/26/2023    08:12   Lipid Panel   Total Cholesterol 198    Triglycerides 273    HDL Cholesterol 36    VLDL Cholesterol 48    LDL Cholesterol  114    LDL/HDL Ratio 2.98          Assessment & Plan   Diagnoses and all orders for this visit:    1. Type 2 diabetes mellitus without complication, without long-term current use of insulin (Primary)  -     Tirzepatide (Mounjaro) 5 MG/0.5ML solution pen-injector; Inject 0.5 mL under the skin into the appropriate area as directed 1 (One) Time Per Week.  Dispense: 2 mL; Refill: 0  -     glucose blood (Accu-Chek Guide) test strip; 1 each by Other route Daily.  Dispense: 100 each; Refill: 0  -     Blood Glucose Monitoring Suppl (Accu-Chek Guide) w/Device kit; Use 1 Device Daily.  Dispense: 1 kit; Refill: 0  -     Lancets (accu-chek multiclix) lancets; 1 each by Other route Daily.  Dispense: 100 each; Refill: 0    2. Mixed hyperlipidemia    3. Medication side effects    4. Morbid obesity    5. BMI 45.0-49.9, adult        Patient has experienced side effects to metformin.  I will be starting him on Mounjaro and he was given samples of 2.5 mg strength and prescription was given for 5 mg.  Healthy lifestyle and diet was discussed and reinforced.   He definitely would benefit from weight loss and lowering his BMI to improve control of his chronic medical issues and decrease the cardiovascular risk.      Return in about 3 months (around 2024) for Next scheduled follow up.    Requested Prescriptions     Signed Prescriptions Disp Refills    Tirzepatide (Mounjaro) 5 MG/0.5ML solution pen-injector 2 mL 0     Sig: Inject 0.5 mL under the skin into the appropriate area as directed 1 (One) Time Per Week.    glucose blood (Accu-Chek Guide) test strip 100 each 0     Si each by Other route Daily.    Blood Glucose Monitoring Suppl (Accu-Chek Guide) w/Device kit 1 kit 0     Sig: Use 1 Device Daily.    Lancets (accu-chek multiclix) lancets 100 each 0     Si each by Other route Daily.

## 2023-11-13 DIAGNOSIS — R79.89 LOW TESTOSTERONE IN MALE: ICD-10-CM

## 2023-11-13 RX ORDER — TESTOSTERONE 16.2 MG/G
GEL TRANSDERMAL
Qty: 75 G | Refills: 0 | Status: SHIPPED | OUTPATIENT
Start: 2023-11-13

## 2023-11-16 DIAGNOSIS — R79.89 LOW TESTOSTERONE IN MALE: ICD-10-CM

## 2023-11-16 RX ORDER — TESTOSTERONE 16.2 MG/G
GEL TRANSDERMAL
Qty: 75 G | Refills: 0 | Status: CANCELLED | OUTPATIENT
Start: 2023-11-16

## 2023-11-16 NOTE — TELEPHONE ENCOUNTER
Caller: Juvenal Rossi    Relationship: Self    Best call back number: [phone]     Requested Prescriptions:   Requested Prescriptions     Pending Prescriptions Disp Refills    Testosterone 20.25 MG/ACT (1.62%) gel 75 g 0        Pharmacy where request should be sent: [facility] #[id number] - [place], [place] - [address] - [phone] PH - [phone] FX     Last office visit with prescribing clinician: 10/25/2023   Last telemedicine visit with prescribing clinician: Visit date not found   Next office visit with prescribing clinician: 1/3/2024     Additional details provided by patient: PATIENT IS OUT OF THIS MEDICATION    Does the patient have less than a 3 day supply:  [x] Yes  [] No    Would you like a call back once the refill request has been completed: [x] Yes [] No    If the office needs to give you a call back, can they leave a voicemail: [x] Yes [] No    Daisy Simons Rep   11/16/23 13:20 EST

## 2023-11-16 NOTE — TELEPHONE ENCOUNTER
I sent prescription for testosterone to his [facility] pharmacy on 11/13/2023.  Did he check with the pharmacy?

## 2023-11-22 DIAGNOSIS — E11.9 TYPE 2 DIABETES MELLITUS WITHOUT COMPLICATION, WITHOUT LONG-TERM CURRENT USE OF INSULIN: ICD-10-CM

## 2023-11-22 RX ORDER — TIRZEPATIDE 5 MG/.5ML
INJECTION, SOLUTION SUBCUTANEOUS
Qty: 2 ML | Refills: 0 | Status: SHIPPED | OUTPATIENT
Start: 2023-11-22 | End: 2023-11-27 | Stop reason: SDUPTHER

## 2023-11-27 DIAGNOSIS — E11.9 TYPE 2 DIABETES MELLITUS WITHOUT COMPLICATION, WITHOUT LONG-TERM CURRENT USE OF INSULIN: ICD-10-CM

## 2023-11-27 RX ORDER — TIRZEPATIDE 5 MG/.5ML
5 INJECTION, SOLUTION SUBCUTANEOUS WEEKLY
Qty: 2 ML | Refills: 0 | Status: SHIPPED | OUTPATIENT
Start: 2023-11-27

## 2023-11-27 NOTE — TELEPHONE ENCOUNTER
Caller: Juvenal Rossi    Relationship: Self    Best call back number: [phone]     Requested Prescriptions:   Requested Prescriptions     Pending Prescriptions Disp Refills    Tirzepatide (Mounjaro) 5 MG/0.5ML solution pen-injector 2 mL 0        Pharmacy where request should be sent: [facility] #[id number] [address] AT [place] [address] & [facility] [address]  - [phone] [facility] [phone]      Last office visit with prescribing clinician: 10/25/2023   Last telemedicine visit with prescribing clinician: Visit date not found   Next office visit with prescribing clinician: 1/3/2024     Additional details provided by patient: OUT    Does the patient have less than a 3 day supply:  [x] Yes  [] No    Would you like a call back once the refill request has been completed: [] Yes [] No    If the office needs to give you a call back, can they leave a voicemail: [] Yes [] No    Gonzalez Flowers   11/27/23 15:14 EST

## 2023-12-24 DIAGNOSIS — R79.89 LOW TESTOSTERONE IN MALE: ICD-10-CM

## 2023-12-25 RX ORDER — TESTOSTERONE 16.2 MG/G
GEL TRANSDERMAL
Qty: 75 G | Refills: 0 | Status: SHIPPED | OUTPATIENT
Start: 2023-12-25

## 2023-12-27 DIAGNOSIS — E11.9 TYPE 2 DIABETES MELLITUS WITHOUT COMPLICATION, WITHOUT LONG-TERM CURRENT USE OF INSULIN: ICD-10-CM

## 2023-12-27 RX ORDER — TIRZEPATIDE 5 MG/.5ML
INJECTION, SOLUTION SUBCUTANEOUS
Qty: 2 ML | Refills: 0 | Status: SHIPPED | OUTPATIENT
Start: 2023-12-27

## 2024-02-15 DIAGNOSIS — E11.9 TYPE 2 DIABETES MELLITUS WITHOUT COMPLICATION, WITHOUT LONG-TERM CURRENT USE OF INSULIN: ICD-10-CM

## 2024-02-15 RX ORDER — TIRZEPATIDE 5 MG/.5ML
INJECTION, SOLUTION SUBCUTANEOUS
Qty: 2 ML | Refills: 0 | Status: SHIPPED | OUTPATIENT
Start: 2024-02-15

## 2024-03-13 ENCOUNTER — TELEPHONE (OUTPATIENT)
Dept: FAMILY MEDICINE CLINIC | Facility: CLINIC | Age: 34
End: 2024-03-13
Payer: COMMERCIAL

## 2024-03-26 ENCOUNTER — OFFICE VISIT (OUTPATIENT)
Dept: FAMILY MEDICINE CLINIC | Facility: CLINIC | Age: 34
End: 2024-03-26
Payer: COMMERCIAL

## 2024-03-26 ENCOUNTER — LAB (OUTPATIENT)
Dept: LAB | Facility: HOSPITAL | Age: 34
End: 2024-03-26
Payer: COMMERCIAL

## 2024-03-26 VITALS
HEIGHT: 67 IN | HEART RATE: 79 BPM | SYSTOLIC BLOOD PRESSURE: 131 MMHG | BODY MASS INDEX: 48.25 KG/M2 | WEIGHT: 307.4 LBS | DIASTOLIC BLOOD PRESSURE: 78 MMHG | TEMPERATURE: 97.5 F | OXYGEN SATURATION: 95 % | RESPIRATION RATE: 16 BRPM

## 2024-03-26 DIAGNOSIS — R79.89 LOW TESTOSTERONE IN MALE: ICD-10-CM

## 2024-03-26 DIAGNOSIS — M25.561 CHRONIC PAIN OF RIGHT KNEE: ICD-10-CM

## 2024-03-26 DIAGNOSIS — E11.9 TYPE 2 DIABETES MELLITUS WITHOUT COMPLICATION, WITHOUT LONG-TERM CURRENT USE OF INSULIN: Primary | ICD-10-CM

## 2024-03-26 DIAGNOSIS — G89.29 CHRONIC PAIN OF RIGHT KNEE: ICD-10-CM

## 2024-03-26 DIAGNOSIS — E78.2 MIXED HYPERLIPIDEMIA: ICD-10-CM

## 2024-03-26 LAB
ALBUMIN SERPL-MCNC: 4.4 G/DL (ref 3.5–5.2)
ALBUMIN UR-MCNC: 10.5 MG/DL
ALBUMIN/GLOB SERPL: 1.3 G/DL
ALP SERPL-CCNC: 70 U/L (ref 39–117)
ALT SERPL W P-5'-P-CCNC: 64 U/L (ref 1–41)
ANION GAP SERPL CALCULATED.3IONS-SCNC: 12.3 MMOL/L (ref 5–15)
AST SERPL-CCNC: 46 U/L (ref 1–40)
BASOPHILS # BLD AUTO: 0.06 10*3/MM3 (ref 0–0.2)
BASOPHILS NFR BLD AUTO: 0.9 % (ref 0–1.5)
BILIRUB SERPL-MCNC: 0.8 MG/DL (ref 0–1.2)
BUN SERPL-MCNC: 9 MG/DL (ref 6–20)
BUN/CREAT SERPL: 8.7 (ref 7–25)
CALCIUM SPEC-SCNC: 9.7 MG/DL (ref 8.6–10.5)
CHLORIDE SERPL-SCNC: 99 MMOL/L (ref 98–107)
CHOLEST SERPL-MCNC: 191 MG/DL (ref 0–200)
CO2 SERPL-SCNC: 27.7 MMOL/L (ref 22–29)
CREAT SERPL-MCNC: 1.04 MG/DL (ref 0.76–1.27)
CREAT UR-MCNC: 422.7 MG/DL
DEPRECATED RDW RBC AUTO: 41.3 FL (ref 37–54)
EGFRCR SERPLBLD CKD-EPI 2021: 96.6 ML/MIN/1.73
EOSINOPHIL # BLD AUTO: 0.25 10*3/MM3 (ref 0–0.4)
EOSINOPHIL NFR BLD AUTO: 3.7 % (ref 0.3–6.2)
ERYTHROCYTE [DISTWIDTH] IN BLOOD BY AUTOMATED COUNT: 14.1 % (ref 12.3–15.4)
GLOBULIN UR ELPH-MCNC: 3.4 GM/DL
GLUCOSE SERPL-MCNC: 99 MG/DL (ref 65–99)
HBA1C MFR BLD: 5.7 % (ref 4.8–5.6)
HCT VFR BLD AUTO: 50.7 % (ref 37.5–51)
HDLC SERPL-MCNC: 30 MG/DL (ref 40–60)
HGB BLD-MCNC: 16.4 G/DL (ref 13–17.7)
IMM GRANULOCYTES # BLD AUTO: 0.07 10*3/MM3 (ref 0–0.05)
IMM GRANULOCYTES NFR BLD AUTO: 1 % (ref 0–0.5)
LDLC SERPL CALC-MCNC: 130 MG/DL (ref 0–100)
LDLC/HDLC SERPL: 4.21 {RATIO}
LYMPHOCYTES # BLD AUTO: 1.76 10*3/MM3 (ref 0.7–3.1)
LYMPHOCYTES NFR BLD AUTO: 26.1 % (ref 19.6–45.3)
MCH RBC QN AUTO: 26.5 PG (ref 26.6–33)
MCHC RBC AUTO-ENTMCNC: 32.3 G/DL (ref 31.5–35.7)
MCV RBC AUTO: 82 FL (ref 79–97)
MICROALBUMIN/CREAT UR: 24.8 MG/G (ref 0–29)
MONOCYTES # BLD AUTO: 0.73 10*3/MM3 (ref 0.1–0.9)
MONOCYTES NFR BLD AUTO: 10.8 % (ref 5–12)
NEUTROPHILS NFR BLD AUTO: 3.87 10*3/MM3 (ref 1.7–7)
NEUTROPHILS NFR BLD AUTO: 57.5 % (ref 42.7–76)
NRBC BLD AUTO-RTO: 0 /100 WBC (ref 0–0.2)
PLATELET # BLD AUTO: 268 10*3/MM3 (ref 140–450)
PMV BLD AUTO: 10.2 FL (ref 6–12)
POTASSIUM SERPL-SCNC: 4.1 MMOL/L (ref 3.5–5.2)
PROT SERPL-MCNC: 7.8 G/DL (ref 6–8.5)
RBC # BLD AUTO: 6.18 10*6/MM3 (ref 4.14–5.8)
SODIUM SERPL-SCNC: 139 MMOL/L (ref 136–145)
TESTOST SERPL-MCNC: >1500 NG/DL (ref 249–836)
TRIGL SERPL-MCNC: 174 MG/DL (ref 0–150)
TSH SERPL DL<=0.05 MIU/L-ACNC: 0.92 UIU/ML (ref 0.27–4.2)
VLDLC SERPL-MCNC: 31 MG/DL (ref 5–40)
WBC NRBC COR # BLD AUTO: 6.74 10*3/MM3 (ref 3.4–10.8)

## 2024-03-26 PROCEDURE — 80061 LIPID PANEL: CPT | Performed by: FAMILY MEDICINE

## 2024-03-26 PROCEDURE — 84403 ASSAY OF TOTAL TESTOSTERONE: CPT

## 2024-03-26 PROCEDURE — 80050 GENERAL HEALTH PANEL: CPT | Performed by: FAMILY MEDICINE

## 2024-03-26 PROCEDURE — 83036 HEMOGLOBIN GLYCOSYLATED A1C: CPT | Performed by: FAMILY MEDICINE

## 2024-03-26 PROCEDURE — 82570 ASSAY OF URINE CREATININE: CPT | Performed by: FAMILY MEDICINE

## 2024-03-26 PROCEDURE — 82043 UR ALBUMIN QUANTITATIVE: CPT | Performed by: FAMILY MEDICINE

## 2024-03-26 RX ORDER — BLOOD-GLUCOSE METER
1 EACH MISCELLANEOUS DAILY
Qty: 1 KIT | Refills: 0 | Status: SHIPPED | OUTPATIENT
Start: 2024-03-26

## 2024-03-26 RX ORDER — BLOOD SUGAR DIAGNOSTIC
1 STRIP MISCELLANEOUS DAILY
Qty: 100 EACH | Refills: 0 | Status: SHIPPED | OUTPATIENT
Start: 2024-03-26

## 2024-03-26 RX ORDER — LANCETS
1 EACH MISCELLANEOUS DAILY
Qty: 100 EACH | Refills: 0 | Status: SHIPPED | OUTPATIENT
Start: 2024-03-26

## 2024-03-26 NOTE — PROGRESS NOTES
Subjective   Chief Complaint   Patient presents with    Diabetes    Hyperlipidemia    low testosterone     Juvenal Rossi is a 34 y.o. male.     Patient Care Team:  Susanna Myers MD as PCP - General (Family Medicine)    History of Present Illness  He is coming in today to follow-up on his chronic medical problems and his medications including diabetes, hyperlipidemia, low testosterone, and chronic issues with his knees.  He was started on Mounjaro last year and reports staying on it for couple of months and had done well with that and lost some weight.  He had last shot about 2 weeks ago.  He also would like to get refill on testosterone gel, he is due for fasting blood work and plans to get this collected today.  He has been having ongoing issues with the right knee pain, he was previously seen by orthopedist, he would like to have his FMLA updated.       The following portions of the patient's history were reviewed and updated as appropriate: allergies, current medications, past family history, past medical history, past social history, past surgical history, and problem list.  Past Medical History:   Diagnosis Date    Low testosterone in male     Obesity     Rupture of anterior cruciate ligament of right knee      Past Surgical History:   Procedure Laterality Date    KNEE ACL RECONSTRUCTION Right     KNEE ARTHROSCOPY W/ ACL RECONSTRUCTION      x2    KNEE MENISCAL REPAIR Right     MENISCECTOMY Left 2008     The patient has a family history of  Family History   Problem Relation Age of Onset    Cancer Mother         Breast cancer    Aneurysm Father      Social History     Socioeconomic History    Marital status: Single   Tobacco Use    Smoking status: Never     Passive exposure: Never    Smokeless tobacco: Never   Vaping Use    Vaping status: Some Days   Substance and Sexual Activity    Alcohol use: Yes     Alcohol/week: 24.0 standard drinks of alcohol     Types: 24 Cans of beer per week    Drug use: Not  "Currently    Sexual activity: Yes     Partners: Female       Review of Systems   Constitutional:  Negative for activity change, fatigue and fever.   Respiratory:  Negative for shortness of breath and wheezing.    Cardiovascular:  Negative for chest pain, palpitations and leg swelling.   Musculoskeletal:  Negative for arthralgias and back pain.   Skin:  Negative for rash.   Neurological:  Negative for tremors and headache.     Visit Vitals  /78 (BP Location: Left arm, Patient Position: Sitting, Cuff Size: Adult)   Pulse 79   Temp 97.5 °F (36.4 °C) (Infrared)   Resp 16   Ht 170.2 cm (67\")   Wt (!) 139 kg (307 lb 6.4 oz)   SpO2 95%   BMI 48.15 kg/m²            Current Outpatient Medications:     albuterol sulfate  (90 Base) MCG/ACT inhaler, Inhale 2 puffs Every 6 (Six) Hours As Needed for Wheezing., Disp: 1 inhaler, Rfl: 0    Blood Glucose Monitoring Suppl (Accu-Chek Guide) w/Device kit, Use 1 Device Daily., Disp: 1 kit, Rfl: 0    glucose blood (Accu-Chek Guide) test strip, 1 each by Other route Daily., Disp: 100 each, Rfl: 0    Lancets (accu-chek multiclix) lancets, 1 each by Other route Daily., Disp: 100 each, Rfl: 0    Testosterone 20.25 MG/ACT (1.62%) gel, PLACE 2 APPLICATION ON THE SKIN AS DIRECTED BY PROVIDER DAILY., Disp: 75 g, Rfl: 0    Tirzepatide (Mounjaro) 5 MG/0.5ML solution pen-injector pen, Inject 0.5 mL under the skin into the appropriate area as directed 1 (One) Time Per Week., Disp: 2 mL, Rfl: 0    Objective   Physical Exam  Vitals and nursing note reviewed.   Constitutional:       General: He is not in acute distress.     Appearance: Normal appearance. He is well-developed. He is not ill-appearing.   HENT:      Head: Normocephalic and atraumatic.   Cardiovascular:      Rate and Rhythm: Normal rate and regular rhythm.      Heart sounds: Normal heart sounds. No murmur heard.     No gallop.   Pulmonary:      Effort: Pulmonary effort is normal. No respiratory distress.      Breath sounds: " Normal breath sounds. No wheezing, rhonchi or rales.   Chest:      Chest wall: No tenderness.   Musculoskeletal:      Cervical back: Normal range of motion and neck supple.   Neurological:      General: No focal deficit present.      Mental Status: He is alert and oriented to person, place, and time. Mental status is at baseline.   Psychiatric:         Mood and Affect: Mood normal.         FOLLOWING LABS WERE REVIEWED TODAY:  CMP          5/2/2023    08:09 9/26/2023    08:12   CMP   Glucose 102  113    BUN 11  12    Creatinine 0.84  0.90    EGFR 118.1  115.7    Sodium 140  139    Potassium 3.9  3.8    Chloride 103  101    Calcium 9.5  9.5    Total Protein 7.6  7.6    Albumin 4.5  4.6    Globulin 3.1  3.0    Total Bilirubin 0.2  0.4    Alkaline Phosphatase 66  60    AST (SGOT) 34  38    ALT (SGPT) 56  57    Albumin/Globulin Ratio 1.5  1.5    BUN/Creatinine Ratio 13.1  13.3    Anion Gap 11.0  13.5      Lipid Panel          9/26/2023    08:12   Lipid Panel   Total Cholesterol 198    Triglycerides 273    HDL Cholesterol 36    VLDL Cholesterol 48    LDL Cholesterol  114    LDL/HDL Ratio 2.98      Most Recent A1C          9/26/2023    08:12   HGBA1C Most Recent   Hemoglobin A1C 6.50          Assessment & Plan   Diagnoses and all orders for this visit:    1. Type 2 diabetes mellitus without complication, without long-term current use of insulin (Primary)  -     Cancel: Comprehensive Metabolic Panel  -     Cancel: CBC Auto Differential  -     Cancel: Hemoglobin A1c  -     Cancel: Lipid Panel  -     Cancel: Microalbumin / Creatinine Urine Ratio - Urine, Clean Catch  -     TSH  -     Tirzepatide (Mounjaro) 5 MG/0.5ML solution pen-injector pen; Inject 0.5 mL under the skin into the appropriate area as directed 1 (One) Time Per Week.  Dispense: 2 mL; Refill: 0  -     Blood Glucose Monitoring Suppl (Accu-Chek Guide) w/Device kit; Use 1 Device Daily.  Dispense: 1 kit; Refill: 0  -     glucose blood (Accu-Chek Guide) test strip; 1  each by Other route Daily.  Dispense: 100 each; Refill: 0  -     Lancets (accu-chek multiclix) lancets; 1 each by Other route Daily.  Dispense: 100 each; Refill: 0    2. Mixed hyperlipidemia  -     Cancel: Comprehensive Metabolic Panel  -     Cancel: Lipid Panel  -     TSH    3. Low testosterone in male  -     Cancel: CBC Auto Differential  -     Testosterone; Future    4. Chronic pain of right knee      I reviewed his medical problems and his medications.  I also reviewed his previous labs and the new set of fasting blood work is being done today.  Compliance with medication was discussed and reinforced.  He will continue Mounjaro and I refilled 5 mg strength for him.  If patient continues to tolerate well I plan to increase the dose to 7.5 mg with the next prescription in 1 month.  He has lost some weight with Mounjaro and he definitely would benefit from more weight loss and lowering his BMI to improve control of his medical issues and lower the cardiovascular risk.  I also addressed his chronic knee pain and I will be filling out his FMLA paperwork.      Return in about 3 months (around 2024) for Next scheduled follow up.    Requested Prescriptions     Signed Prescriptions Disp Refills    Tirzepatide (Mounjaro) 5 MG/0.5ML solution pen-injector pen 2 mL 0     Sig: Inject 0.5 mL under the skin into the appropriate area as directed 1 (One) Time Per Week.    Blood Glucose Monitoring Suppl (Accu-Chek Guide) w/Device kit 1 kit 0     Sig: Use 1 Device Daily.    glucose blood (Accu-Chek Guide) test strip 100 each 0     Si each by Other route Daily.    Lancets (accu-chek multiclix) lancets 100 each 0     Si each by Other route Daily.

## 2024-04-02 DIAGNOSIS — R79.89 LOW TESTOSTERONE IN MALE: Primary | ICD-10-CM

## 2024-04-22 DIAGNOSIS — E11.9 TYPE 2 DIABETES MELLITUS WITHOUT COMPLICATION, WITHOUT LONG-TERM CURRENT USE OF INSULIN: ICD-10-CM

## 2024-04-22 NOTE — TELEPHONE ENCOUNTER
Caller: Juvenal Rossi    Relationship: Self    Best call back number: [phone]     Requested Prescriptions:   Requested Prescriptions     Pending Prescriptions Disp Refills    Tirzepatide (Mounjaro) 5 MG/0.5ML solution pen-injector pen 2 mL 0     Sig: Inject 0.5 mL under the skin into the appropriate area as directed 1 (One) Time Per Week.        Pharmacy where request should be sent: [facility] #[id number] [address] AT [place] [address] & [facility] - [phone]  - [phone]     Last office visit with prescribing clinician: 3/26/2024   Last telemedicine visit with prescribing clinician: Visit date not found   Next office visit with prescribing clinician: 6/26/2024     Additional details provided by patient:     Does the patient have less than a 3 day supply:  [x] Yes  [] No    Would you like a call back once the refill request has been completed: [x] Yes [] No    If the office needs to give you a call back, can they leave a voicemail: [] Yes [] No    Daisy Cortés Rep   04/22/24 11:24 EDT

## 2024-04-28 ENCOUNTER — APPOINTMENT (OUTPATIENT)
Dept: GENERAL RADIOLOGY | Facility: HOSPITAL | Age: 34
End: 2024-04-28
Payer: COMMERCIAL

## 2024-04-28 ENCOUNTER — HOSPITAL ENCOUNTER (EMERGENCY)
Facility: HOSPITAL | Age: 34
Discharge: HOME | End: 2024-04-29
Attending: EMERGENCY MEDICINE | Admitting: EMERGENCY MEDICINE
Payer: COMMERCIAL

## 2024-04-28 DIAGNOSIS — R10.9 FLANK PAIN: Primary | ICD-10-CM

## 2024-04-28 LAB
ALBUMIN SERPL-MCNC: 4.2 G/DL (ref 3.5–5.2)
ALBUMIN/GLOB SERPL: 1.4 G/DL
ALP SERPL-CCNC: 65 U/L (ref 39–117)
ALT SERPL W P-5'-P-CCNC: 56 U/L (ref 1–41)
AMPHET+METHAMPHET UR QL: NEGATIVE
ANION GAP SERPL CALCULATED.3IONS-SCNC: 11 MMOL/L (ref 5–15)
AST SERPL-CCNC: 50 U/L (ref 1–40)
BARBITURATES UR QL SCN: NEGATIVE
BASOPHILS # BLD AUTO: 0.07 10*3/MM3 (ref 0–0.2)
BASOPHILS NFR BLD AUTO: 0.8 % (ref 0–1.5)
BENZODIAZ UR QL SCN: NEGATIVE
BILIRUB SERPL-MCNC: 0.2 MG/DL (ref 0–1.2)
BILIRUB UR QL STRIP: NEGATIVE
BUN SERPL-MCNC: 7 MG/DL (ref 6–20)
BUN/CREAT SERPL: 7.5 (ref 7–25)
CALCIUM SPEC-SCNC: 9.3 MG/DL (ref 8.6–10.5)
CANNABINOIDS SERPL QL: NEGATIVE
CHLORIDE SERPL-SCNC: 101 MMOL/L (ref 98–107)
CLARITY UR: CLEAR
CO2 SERPL-SCNC: 24 MMOL/L (ref 22–29)
COCAINE UR QL: NEGATIVE
COLOR UR: YELLOW
CREAT SERPL-MCNC: 0.93 MG/DL (ref 0.76–1.27)
D DIMER PPP FEU-MCNC: 0.34 MG/L (FEU) (ref 0–0.5)
D-LACTATE SERPL-SCNC: 1.2 MMOL/L (ref 0.3–2)
DEPRECATED RDW RBC AUTO: 40.7 FL (ref 37–54)
EGFRCR SERPLBLD CKD-EPI 2021: 110.5 ML/MIN/1.73
EOSINOPHIL # BLD AUTO: 0.29 10*3/MM3 (ref 0–0.4)
EOSINOPHIL NFR BLD AUTO: 3.3 % (ref 0.3–6.2)
ERYTHROCYTE [DISTWIDTH] IN BLOOD BY AUTOMATED COUNT: 14.1 % (ref 12.3–15.4)
ETHANOL UR QL: <0.01 %
FLUAV SUBTYP SPEC NAA+PROBE: NOT DETECTED
FLUBV RNA ISLT QL NAA+PROBE: NOT DETECTED
GLOBULIN UR ELPH-MCNC: 3 GM/DL
GLUCOSE SERPL-MCNC: 112 MG/DL (ref 65–99)
GLUCOSE UR STRIP-MCNC: NEGATIVE MG/DL
HCT VFR BLD AUTO: 48.9 % (ref 37.5–51)
HGB BLD-MCNC: 15.9 G/DL (ref 13–17.7)
HGB UR QL STRIP.AUTO: NEGATIVE
HOLD SPECIMEN: NORMAL
IMM GRANULOCYTES # BLD AUTO: 0.09 10*3/MM3 (ref 0–0.05)
IMM GRANULOCYTES NFR BLD AUTO: 1 % (ref 0–0.5)
KETONES UR QL STRIP: NEGATIVE
LEUKOCYTE ESTERASE UR QL STRIP.AUTO: NEGATIVE
LIPASE SERPL-CCNC: 49 U/L (ref 13–60)
LYMPHOCYTES # BLD AUTO: 2.41 10*3/MM3 (ref 0.7–3.1)
LYMPHOCYTES NFR BLD AUTO: 27.4 % (ref 19.6–45.3)
MAGNESIUM SERPL-MCNC: 1.7 MG/DL (ref 1.6–2.6)
MCH RBC QN AUTO: 26.1 PG (ref 26.6–33)
MCHC RBC AUTO-ENTMCNC: 32.5 G/DL (ref 31.5–35.7)
MCV RBC AUTO: 80.2 FL (ref 79–97)
METHADONE UR QL SCN: NEGATIVE
MONOCYTES # BLD AUTO: 0.83 10*3/MM3 (ref 0.1–0.9)
MONOCYTES NFR BLD AUTO: 9.4 % (ref 5–12)
NEUTROPHILS NFR BLD AUTO: 5.11 10*3/MM3 (ref 1.7–7)
NEUTROPHILS NFR BLD AUTO: 58.1 % (ref 42.7–76)
NITRITE UR QL STRIP: NEGATIVE
NRBC BLD AUTO-RTO: 0 /100 WBC (ref 0–0.2)
NT-PROBNP SERPL-MCNC: <36 PG/ML (ref 0–450)
OPIATES UR QL: NEGATIVE
OXYCODONE UR QL SCN: NEGATIVE
PH UR STRIP.AUTO: 8.5 [PH] (ref 5–8)
PLATELET # BLD AUTO: 231 10*3/MM3 (ref 140–450)
PMV BLD AUTO: 10.3 FL (ref 6–12)
POTASSIUM SERPL-SCNC: 3.8 MMOL/L (ref 3.5–5.2)
PROT SERPL-MCNC: 7.2 G/DL (ref 6–8.5)
PROT UR QL STRIP: NEGATIVE
RBC # BLD AUTO: 6.1 10*6/MM3 (ref 4.14–5.8)
SARS-COV-2 RNA RESP QL NAA+PROBE: NOT DETECTED
SODIUM SERPL-SCNC: 136 MMOL/L (ref 136–145)
SP GR UR STRIP: 1.01 (ref 1–1.03)
TROPONIN T SERPL HS-MCNC: <6 NG/L
TSH SERPL DL<=0.05 MIU/L-ACNC: 1.32 UIU/ML (ref 0.27–4.2)
UROBILINOGEN UR QL STRIP: ABNORMAL
WBC NRBC COR # BLD AUTO: 8.8 10*3/MM3 (ref 3.4–10.8)

## 2024-04-28 PROCEDURE — 84484 ASSAY OF TROPONIN QUANT: CPT | Performed by: PHYSICIAN ASSISTANT

## 2024-04-28 PROCEDURE — 83735 ASSAY OF MAGNESIUM: CPT | Performed by: PHYSICIAN ASSISTANT

## 2024-04-28 PROCEDURE — 80307 DRUG TEST PRSMV CHEM ANLYZR: CPT | Performed by: PHYSICIAN ASSISTANT

## 2024-04-28 PROCEDURE — 83880 ASSAY OF NATRIURETIC PEPTIDE: CPT | Performed by: PHYSICIAN ASSISTANT

## 2024-04-28 PROCEDURE — 85379 FIBRIN DEGRADATION QUANT: CPT | Performed by: PHYSICIAN ASSISTANT

## 2024-04-28 PROCEDURE — 71045 X-RAY EXAM CHEST 1 VIEW: CPT

## 2024-04-28 PROCEDURE — 99285 EMERGENCY DEPT VISIT HI MDM: CPT

## 2024-04-28 PROCEDURE — 80050 GENERAL HEALTH PANEL: CPT | Performed by: PHYSICIAN ASSISTANT

## 2024-04-28 PROCEDURE — 87636 SARSCOV2 & INF A&B AMP PRB: CPT | Performed by: PHYSICIAN ASSISTANT

## 2024-04-28 PROCEDURE — 36415 COLL VENOUS BLD VENIPUNCTURE: CPT

## 2024-04-28 PROCEDURE — 83605 ASSAY OF LACTIC ACID: CPT

## 2024-04-28 PROCEDURE — 87040 BLOOD CULTURE FOR BACTERIA: CPT | Performed by: PHYSICIAN ASSISTANT

## 2024-04-28 PROCEDURE — 93005 ELECTROCARDIOGRAM TRACING: CPT | Performed by: PHYSICIAN ASSISTANT

## 2024-04-28 PROCEDURE — 81003 URINALYSIS AUTO W/O SCOPE: CPT | Performed by: PHYSICIAN ASSISTANT

## 2024-04-28 PROCEDURE — 82077 ASSAY SPEC XCP UR&BREATH IA: CPT | Performed by: PHYSICIAN ASSISTANT

## 2024-04-28 PROCEDURE — 83690 ASSAY OF LIPASE: CPT | Performed by: PHYSICIAN ASSISTANT

## 2024-04-28 RX ORDER — SODIUM CHLORIDE 0.9 % (FLUSH) 0.9 %
10 SYRINGE (ML) INJECTION AS NEEDED
Status: DISCONTINUED | OUTPATIENT
Start: 2024-04-28 | End: 2024-04-29 | Stop reason: HOSPADM

## 2024-04-28 RX ORDER — ACETAMINOPHEN 500 MG
1000 TABLET ORAL ONCE
Status: COMPLETED | OUTPATIENT
Start: 2024-04-28 | End: 2024-04-28

## 2024-04-28 RX ADMIN — ACETAMINOPHEN 1000 MG: 500 TABLET, FILM COATED ORAL at 23:36

## 2024-04-29 ENCOUNTER — APPOINTMENT (OUTPATIENT)
Dept: CT IMAGING | Facility: HOSPITAL | Age: 34
End: 2024-04-29
Payer: COMMERCIAL

## 2024-04-29 VITALS
SYSTOLIC BLOOD PRESSURE: 127 MMHG | TEMPERATURE: 97.8 F | BODY MASS INDEX: 49.44 KG/M2 | WEIGHT: 315 LBS | DIASTOLIC BLOOD PRESSURE: 75 MMHG | RESPIRATION RATE: 16 BRPM | OXYGEN SATURATION: 95 % | HEIGHT: 67 IN | HEART RATE: 55 BPM

## 2024-04-29 LAB
QT INTERVAL: 384 MS
QTC INTERVAL: 423 MS

## 2024-04-29 PROCEDURE — 74177 CT ABD & PELVIS W/CONTRAST: CPT

## 2024-04-29 PROCEDURE — 25510000001 IOPAMIDOL PER 1 ML: Performed by: EMERGENCY MEDICINE

## 2024-04-29 RX ADMIN — IOPAMIDOL 100 ML: 755 INJECTION, SOLUTION INTRAVENOUS at 01:21

## 2024-04-29 NOTE — DISCHARGE INSTRUCTIONS
Take Tylenol or ibuprofen as needed for pain or fever.    Take other medications as prescribed    Follow-up with primary care provider for recheck  Return to the ER for new or worsening symptoms

## 2024-04-29 NOTE — ED PROVIDER NOTES
Subjective   History of Present Illness  Chief Complaint: Flank pain shortness of breath     Patient is a 34-year-old male no significant past medical history presents to the ER with complaints of flank pain, back pain and shortness of breath for 2 days.  Patient states he started to have pain right flank and right upper quadrant seem to radiate into his back.  No nausea vomiting or diarrhea.  He reports shortness of breath as well that developed today.  He reports the pain in his side seems to get worse with deep inspiration.  Mild cough, no hemoptysis.  No congestion headache lightheadedness.  He does report that he feels that his entire body is swollen, and states that he woke up like this.  Patient states that since he arrived to the hospital he became profusely diaphoretic and sweaty.  No new medications.  Patient does take testosterone supplements.  No history of PE or DVT.  Patient states that he did drive to and from [place] twice yesterday.  No fever or chills.    PCP: Susanna Myers    History provided by:  Patient      Review of Systems   Constitutional:  Positive for diaphoresis and fatigue. Negative for chills and fever.   HENT:  Negative for congestion, sore throat and trouble swallowing.    Eyes: Negative.    Respiratory:  Positive for shortness of breath. Negative for cough and wheezing.    Cardiovascular:  Positive for leg swelling. Negative for chest pain.   Gastrointestinal:  Positive for abdominal pain. Negative for diarrhea, nausea and vomiting.   Endocrine: Negative.    Genitourinary:  Positive for flank pain. Negative for dysuria.   Musculoskeletal:  Negative for myalgias.   Skin:  Negative for rash.   Allergic/Immunologic: Negative.    Neurological:  Negative for weakness and headaches.   Psychiatric/Behavioral:  Negative for behavioral problems.    All other systems reviewed and are negative.      Past Medical History:   Diagnosis Date    Low testosterone in male     Obesity     Rupture  of anterior cruciate ligament of right knee        Allergies   Allergen Reactions    Metformin GI Intolerance     Also had some breathing problems       Past Surgical History:   Procedure Laterality Date    KNEE ACL RECONSTRUCTION Right     KNEE ARTHROSCOPY W/ ACL RECONSTRUCTION      x2    KNEE MENISCAL REPAIR Right     MENISCECTOMY Left 2008       Family History   Problem Relation Age of Onset    Cancer Mother         Breast cancer    Aneurysm Father        Social History     Socioeconomic History    Marital status: Single   Tobacco Use    Smoking status: Never     Passive exposure: Never    Smokeless tobacco: Never   Vaping Use    Vaping status: Some Days   Substance and Sexual Activity    Alcohol use: Yes     Alcohol/week: 24.0 standard drinks of alcohol     Types: 24 Cans of beer per week    Drug use: Not Currently    Sexual activity: Yes     Partners: Female           Objective   Physical Exam  Vitals and nursing note reviewed.   Constitutional:       General: He is not in acute distress.     Appearance: Normal appearance. He is obese. He is diaphoretic.   HENT:      Head: Normocephalic.      Nose: Nose normal.      Mouth/Throat:      Mouth: Mucous membranes are moist.      Pharynx: No oropharyngeal exudate.   Eyes:      Extraocular Movements: Extraocular movements intact.      Pupils: Pupils are equal, round, and reactive to light.   Cardiovascular:      Rate and Rhythm: Normal rate and regular rhythm.      Pulses: Normal pulses.      Heart sounds: Normal heart sounds. No murmur heard.  Pulmonary:      Effort: Pulmonary effort is normal.      Breath sounds: Normal breath sounds.   Abdominal:      General: Abdomen is flat.      Tenderness: There is abdominal tenderness.      Comments: Mild generalized abdominal tenderness   Musculoskeletal:      Right lower leg: No edema.      Left lower leg: Edema present.   Skin:     General: Skin is warm.      Capillary Refill: Capillary refill takes less than 2 seconds.      " Findings: No bruising.   Neurological:      General: No focal deficit present.      Mental Status: He is alert and oriented to person, place, and time.      Motor: No weakness.   Psychiatric:         Mood and Affect: Mood normal.         Behavior: Behavior normal.         ECG 12 Lead      Date/Time: 4/29/2024 4:46 AM    Performed by: Jenniffer Sims PA  Authorized by: Waldemar Patiño MD  Interpreted by ED physician  Previous ECG: no previous ECG available  Rhythm: sinus rhythm  BPM: 73  QRS axis: normal  Conduction: conduction normal  Clinical impression: non-specific ECG               ED Course    /75   Pulse 55   Temp 97.8 °F (36.6 °C) (Temporal)   Resp 16   Ht 170.2 cm (67\")   Wt (!) 146 kg (321 lb 6.9 oz)   SpO2 95%   BMI 50.34 kg/m²   Labs Reviewed   COMPREHENSIVE METABOLIC PANEL - Abnormal; Notable for the following components:       Result Value    Glucose 112 (*)     ALT (SGPT) 56 (*)     AST (SGOT) 50 (*)     All other components within normal limits    Narrative:     GFR Normal >60  Chronic Kidney Disease <60  Kidney Failure <15     URINALYSIS W/ MICROSCOPIC IF INDICATED (NO CULTURE) - Abnormal; Notable for the following components:    pH, UA 8.5 (*)     All other components within normal limits    Narrative:     Urine microscopic not indicated.   CBC WITH AUTO DIFFERENTIAL - Abnormal; Notable for the following components:    RBC 6.10 (*)     MCH 26.1 (*)     Immature Grans % 1.0 (*)     Immature Grans, Absolute 0.09 (*)     All other components within normal limits   COVID-19 AND FLU A/B, NP SWAB IN TRANSPORT MEDIA 1 HR TAT - Normal    Narrative:     Fact sheet for providers: https://www.fda.gov/media/453853/download    Fact sheet for patients: https://www.fda.gov/media/764199/download    Test performed by PCR.   LIPASE - Normal   SINGLE HS TROPONIN T - Normal    Narrative:     High Sensitive Troponin T Reference Range:  <14.0 ng/L- Negative Female for AMI  <22.0 ng/L- Negative Male for " AMI  >=14 - Abnormal Female indicating possible myocardial injury.  >=22 - Abnormal Male indicating possible myocardial injury.   Clinicians would have to utilize clinical acumen, EKG, Troponin, and serial changes to determine if it is an Acute Myocardial Infarction or myocardial injury due to an underlying chronic condition.        BNP (IN-HOUSE) - Normal    Narrative:     This assay is used as an aid in the diagnosis of individuals suspected of having heart failure. It can be used as an aid in the diagnosis of acute decompensated heart failure (ADHF) in patients presenting with signs and symptoms of ADHF to the emergency department (ED). In addition, NT-proBNP of <300 pg/mL indicates ADHF is not likely.    Age Range Result Interpretation  NT-proBNP Concentration (pg/mL:      <50             Positive            >450                   Gray                 300-450                    Negative             <300    50-75           Positive            >900                  Gray                300-900                  Negative            <300      >75             Positive            >1800                  Gray                300-1800                  Negative            <300   D-DIMER, QUANTITATIVE - Normal    Narrative:     According to the assay 's published package insert, a normal (<0.50 mg/L (FEU)) D-dimer result in conjunction with a non-high clinical probability assessment, excludes deep vein thrombosis (DVT) and pulmonary embolism (PE) with high sensitivity.    D-dimer values increase with age and this can make VTE exclusion of an older population difficult. To address this, the American College of Phys[employer], based on best available evidence and recent guidelines, recommends that clinicians use age-adjusted D-dimer thresholds in patients greater than 50 years of age with: a) a low probability of PE who do not meet all Pulmonary Embolism Rule Out Criteria, or b) in those with intermediate probability of  "PE.   The formula for an age-adjusted D-dimer cut-off is \"age/100\".  For example, a 60 year old patient would have an age-adjusted cut-off of 0.60 mg/L (FEU) and an 80 year old 0.80 mg/L (FEU).   URINE DRUG SCREEN - Normal    Narrative:     Negative Thresholds Per Drugs Screened:    Amphetamines                 500 ng/ml  Barbiturates                 200 ng/ml  Benzodiazepines              100 ng/ml  Cocaine                      300 ng/ml  Methadone                    300 ng/ml  Opiates                      300 ng/ml  Oxycodone                    100 ng/ml  THC                           50 ng/ml    The Normal Value for all drugs tested is negative. This report includes final unconfirmed screening results to be used for medical treatment purposes only. Unconfirmed results must not be used for non-medical purposes such as employment or legal testing. Clinical consideration should be applied to any drug of abuse test, particularly when unconfirmed results are used.          All urine drugs of abuse requests without chain of custody are for medical screening purposes only.  False positives are possible.     MAGNESIUM - Normal   TSH - Normal   POC LACTATE - Normal   BLOOD CULTURE   BLOOD CULTURE   ETHANOL    Narrative:     Plasma Ethanol Clinical Symptoms:    ETOH (%)               Clinical Symptom  .01-.05              No apparent influence  .03-.12              Euphoria, Diminished judgment and attention   .09-.25              Impaired comprehension, Muscle incoordination  .18-.30              Confusion, Staggered gait, Slurred speech  .25-.40              Markedly decreased response to stimuli, unable to stand or                        walk, vomitting, sleep or stupor  .35-.50              Comatose, Anesthesia, Subnormal body temperature       POC LACTATE   CBC AND DIFFERENTIAL    Narrative:     The following orders were created for panel order CBC & Differential.  Procedure                               Abnormality   "       Status                     ---------                               -----------         ------                     CBC Auto Differential[[id number]]        Abnormal            Final result                 Please view results for these tests on the individual orders.   EXTRA TUBES    Narrative:     The following orders were created for panel order Extra Tubes.  Procedure                               Abnormality         Status                     ---------                               -----------         ------                     Gold Top - SST[[id number]]                                   Final result                 Please view results for these tests on the individual orders.   GOLD TOP - SST     Medications   sodium chloride 0.9 % flush 10 mL (has no administration in time range)   acetaminophen (TYLENOL) tablet 1,000 mg (1,000 mg Oral Given 4/28/24 0358)   iopamidol (ISOVUE-370) 76 % injection 100 mL (100 mL Intravenous Given 4/29/24 0121)     CT Abdomen Pelvis With Contrast    Result Date: 4/29/2024  Impression: Diffuse fatty infiltration of the liver. No acute abdominal or pelvic abnormality Electronically Signed: Dennis Mejia MD  4/29/2024 2:01 AM EDT  Workstation ID: [id number]    XR Chest 1 View    Result Date: 4/28/2024  Impression: No active disease Electronically Signed: Dennis Mejia MD  4/28/2024 10:04 PM EDT  Workstation ID: [id number]                                            Medical Decision Making  Differential Dx (Includes but not limited to): PE pneumonia viral syndrome UTI, STEMI  Medical Records Reviewed: No pertinent records to review  Labs: On my interpretation, no leukocytosis electrolytes within normal limits, normal troponin BNP D-dimer urinalysis negative  Imaging: Imaging was reviewed by myself, independently interpreted by radiology chest x-ray shows no obvious pneumonia  Telemetry: EKG was reviewed by myself, independently interpreted by Dr. Patiño, sinus rhythm rate of 73 with no  acute ST changes.  Testing considered but not ordered: CT head patient denies headache or head injury  Nature of Complaint: Acute  Admission vs Discharge: Discharge      Discussion: While in the ED IV was placed and labs were obtained appropriate PPE was worn during exam and throughout all encounters with the patient.  Patient afebrile nontoxic in appearance but is diaphoretic.  Vital signs otherwise stable.  Lab work reassuring.  Imaging unremarkable.  Patient was given Tylenol for headache.  On reevaluation patient reports feeling much better he is no longer diaphoretic.  Vital signs are stable.  Etiology of patient's symptoms unclear.  I see no indication for admission at this time.  I see no evidence of sepsis infectious process PE NSTEMI ACS.  Patient felt stable for discharge.    Discharge plan and instructions were discussed with the patient who verbalized understanding and is in agreement with the plan, all questions were answered at this time.  Patient is aware of signs symptoms that would require immediate return to the emergency room.  Patient understands importance of following up with primary care provider for further evaluation and worsening concerns as well as blood pressure recheck in the next 4 weeks.    Patient was discharged in improved stable condition with an upright steady gait.    Patient is aware that discharge does not mean that nothing is wrong but indicates no emergencies present and they must continue care with follow-up as given below or physician of their choice.    Problems Addressed:  Flank pain: acute illness or injury    Amount and/or Complexity of Data Reviewed  Labs: ordered. Decision-making details documented in ED Course.  Radiology: ordered. Decision-making details documented in ED Course.  ECG/medicine tests: ordered. Decision-making details documented in ED Course.    Risk  OTC drugs.  Prescription drug management.        Final diagnoses:   Flank pain       ED Disposition  ED  Disposition       ED Disposition   Discharge    Condition   Stable    Comment   --               Susanna Myers MD  [address] [place] [address]  [phone]    Schedule an appointment as soon as possible for a visit in 2 days  As needed, If symptoms worsen         Medication List      No changes were made to your prescriptions during this visit.            Jenniffer Sims PA  04/29/24 0450

## 2024-05-03 LAB
BACTERIA SPEC AEROBE CULT: NORMAL
BACTERIA SPEC AEROBE CULT: NORMAL

## 2024-05-29 DIAGNOSIS — E11.9 TYPE 2 DIABETES MELLITUS WITHOUT COMPLICATION, WITHOUT LONG-TERM CURRENT USE OF INSULIN: ICD-10-CM

## 2024-05-29 NOTE — TELEPHONE ENCOUNTER
Caller: Juvenal Rossi    Relationship: Self    Best call back number: [phone] (Mobile)     Requested Prescriptions:   Requested Prescriptions     Pending Prescriptions Disp Refills    Tirzepatide (Mounjaro) 5 MG/0.5ML solution pen-injector pen 2 mL 0     Sig: Inject 0.5 mL under the skin into the appropriate area as directed 1 (One) Time Per Week.   PATIENT STATES HE IS SUPPOSED TO BE ON THE 7.5MG DOSAGE NOW BUT PHARMACY DOES NOT HAVE THIS PRESCRIPTION     Pharmacy where request should be sent: [id number] [place], [place] - [address] AT SEC OF [place] [address] & [facility] [address]  - [phone]  - [phone] FX     Last office visit with prescribing clinician: 3/26/2024   Last telemedicine visit with prescribing clinician: Visit date not found   Next office visit with prescribing clinician: 6/26/2024     Additional details provided by patient: PATIENT STATES HE IS SUPPOSED TO BE ON THE 7.5MG DOSAGE NOW BUT PHARMACY DOES NOT HAVE THIS PRESCRIPTION -     PATIENT IS ASKING FOR THIS PRESCRIPTION TO BE SENT TO PHARMACY ASAP    Does the patient have less than a 3 day supply:  [x] Yes  [] No    Would you like a call back once the refill request has been completed: [x] Yes [] No    If the office needs to give you a call back, can they leave a voicemail: [x] Yes [] No    Daisy Hirsch Rep   05/29/24 10:04 EDT

## 2024-06-15 DIAGNOSIS — E11.9 TYPE 2 DIABETES MELLITUS WITHOUT COMPLICATION, WITHOUT LONG-TERM CURRENT USE OF INSULIN: ICD-10-CM

## 2024-06-16 RX ORDER — TIRZEPATIDE 5 MG/.5ML
INJECTION, SOLUTION SUBCUTANEOUS
Qty: 2 ML | Refills: 0 | OUTPATIENT
Start: 2024-06-16

## 2024-06-26 ENCOUNTER — LAB (OUTPATIENT)
Dept: FAMILY MEDICINE CLINIC | Facility: CLINIC | Age: 34
End: 2024-06-26
Payer: COMMERCIAL

## 2024-06-26 ENCOUNTER — OFFICE VISIT (OUTPATIENT)
Dept: FAMILY MEDICINE CLINIC | Facility: CLINIC | Age: 34
End: 2024-06-26
Payer: COMMERCIAL

## 2024-06-26 VITALS
DIASTOLIC BLOOD PRESSURE: 74 MMHG | HEART RATE: 70 BPM | HEIGHT: 67 IN | SYSTOLIC BLOOD PRESSURE: 115 MMHG | WEIGHT: 306.4 LBS | TEMPERATURE: 97.5 F | OXYGEN SATURATION: 94 % | RESPIRATION RATE: 16 BRPM | BODY MASS INDEX: 48.09 KG/M2

## 2024-06-26 DIAGNOSIS — G89.29 CHRONIC PAIN OF RIGHT KNEE: ICD-10-CM

## 2024-06-26 DIAGNOSIS — M25.561 CHRONIC PAIN OF RIGHT KNEE: ICD-10-CM

## 2024-06-26 DIAGNOSIS — E11.9 TYPE 2 DIABETES MELLITUS WITHOUT COMPLICATION, WITHOUT LONG-TERM CURRENT USE OF INSULIN: Primary | ICD-10-CM

## 2024-06-26 DIAGNOSIS — E78.2 MIXED HYPERLIPIDEMIA: ICD-10-CM

## 2024-06-26 DIAGNOSIS — R74.8 ELEVATED LIVER ENZYMES: ICD-10-CM

## 2024-06-26 LAB
ALBUMIN SERPL-MCNC: 4.4 G/DL (ref 3.5–5.2)
ALBUMIN/GLOB SERPL: 1.5 G/DL
ALP SERPL-CCNC: 52 U/L (ref 39–117)
ALT SERPL W P-5'-P-CCNC: 100 U/L (ref 1–41)
ANION GAP SERPL CALCULATED.3IONS-SCNC: 11.7 MMOL/L (ref 5–15)
AST SERPL-CCNC: 49 U/L (ref 1–40)
BILIRUB SERPL-MCNC: 0.3 MG/DL (ref 0–1.2)
BUN SERPL-MCNC: 7 MG/DL (ref 6–20)
BUN/CREAT SERPL: 7.1 (ref 7–25)
CALCIUM SPEC-SCNC: 9.1 MG/DL (ref 8.6–10.5)
CHLORIDE SERPL-SCNC: 101 MMOL/L (ref 98–107)
CO2 SERPL-SCNC: 27.3 MMOL/L (ref 22–29)
CREAT SERPL-MCNC: 0.98 MG/DL (ref 0.76–1.27)
EGFRCR SERPLBLD CKD-EPI 2021: 103.8 ML/MIN/1.73
GLOBULIN UR ELPH-MCNC: 2.9 GM/DL
GLUCOSE SERPL-MCNC: 89 MG/DL (ref 65–99)
HBA1C MFR BLD: 6.1 % (ref 4.8–5.6)
POTASSIUM SERPL-SCNC: 4.3 MMOL/L (ref 3.5–5.2)
PROT SERPL-MCNC: 7.3 G/DL (ref 6–8.5)
SODIUM SERPL-SCNC: 140 MMOL/L (ref 136–145)

## 2024-06-26 PROCEDURE — 99214 OFFICE O/P EST MOD 30 MIN: CPT | Performed by: FAMILY MEDICINE

## 2024-06-26 PROCEDURE — 84466 ASSAY OF TRANSFERRIN: CPT | Performed by: FAMILY MEDICINE

## 2024-06-26 PROCEDURE — 36415 COLL VENOUS BLD VENIPUNCTURE: CPT | Performed by: FAMILY MEDICINE

## 2024-06-26 PROCEDURE — 82728 ASSAY OF FERRITIN: CPT | Performed by: FAMILY MEDICINE

## 2024-06-26 PROCEDURE — 80053 COMPREHEN METABOLIC PANEL: CPT | Performed by: FAMILY MEDICINE

## 2024-06-26 PROCEDURE — 83036 HEMOGLOBIN GLYCOSYLATED A1C: CPT | Performed by: FAMILY MEDICINE

## 2024-06-26 PROCEDURE — 83540 ASSAY OF IRON: CPT | Performed by: FAMILY MEDICINE

## 2024-06-26 PROCEDURE — 85025 COMPLETE CBC W/AUTO DIFF WBC: CPT | Performed by: FAMILY MEDICINE

## 2024-06-26 PROCEDURE — 82977 ASSAY OF GGT: CPT | Performed by: FAMILY MEDICINE

## 2024-06-26 NOTE — PROGRESS NOTES
Subjective   Chief Complaint   Patient presents with    Diabetes    Knee Pain     Right,chronic     Juvenal Rossi is a 34 y.o. male.     Patient Care Team:  Susanna Myers MD as PCP - General (Family Medicine)    History of Present Illness  He is coming in today to follow-up on his diabetes and his elevated BMI and weight issues.  He has been on Mounjaro now for about 3 months and has done overall well with that.  He is now on 7.5 mg strength and he finally feels that the strength is helping to curb his appetite, he has not lost much weight however.  He also wants to talk about his chronic right knee problems.  He first torn his ACL in 2014 and then had a repair done, he reinjured the knee again in about 2016 and had another arthroscopic surgery.  He tells me that his pain again started getting much worse about a year ago and has been progressively getting worse.  He previously saw a orthopedist about 1 year ago and had x-ray done and cortisone injection, but that did not really help.  About 2 weeks ago while walking to the car he stepped off the curb and twisted the right knee and the pain has been even worse since then.  At times the knee feels unstable.  Due to the injury 2 weeks ago he missed work, he tells me that he was not able to work between 6/8 through 6/18 and he returned back to work on 6/19 and he provided a [facility] paperwork for me to fill out.       The following portions of the patient's history were reviewed and updated as appropriate: allergies, current medications, past family history, past medical history, past social history, past surgical history, and problem list.  Past Medical History:   Diagnosis Date    Low testosterone in male     Obesity     Rupture of anterior cruciate ligament of right knee      Past Surgical History:   Procedure Laterality Date    KNEE ACL RECONSTRUCTION Right     KNEE ARTHROSCOPY W/ ACL RECONSTRUCTION      x2    KNEE MENISCAL REPAIR Right     MENISCECTOMY Left 2008  "    The patient has a family history of  Family History   Problem Relation Age of Onset    Cancer Mother         Breast cancer    Aneurysm Father      Social History     Socioeconomic History    Marital status: Single   Tobacco Use    Smoking status: Never     Passive exposure: Never    Smokeless tobacco: Never   Vaping Use    Vaping status: Some Days   Substance and Sexual Activity    Alcohol use: Yes     Alcohol/week: 24.0 standard drinks of alcohol     Types: 24 Cans of beer per week    Drug use: Not Currently    Sexual activity: Yes     Partners: Female       Review of Systems   Constitutional:  Negative for activity change, fatigue and fever.   Respiratory:  Negative for shortness of breath and wheezing.    Cardiovascular:  Negative for chest pain, palpitations and leg swelling.   Musculoskeletal:  Positive for arthralgias. Negative for back pain.   Skin:  Negative for rash.   Neurological:  Negative for tremors and headache.     Visit Vitals  /74 (BP Location: Left arm, Patient Position: Sitting, Cuff Size: Adult)   Pulse 70   Temp 97.5 °F (36.4 °C) (Infrared)   Resp 16   Ht 170.2 cm (67.01\")   Wt (!) 139 kg (306 lb 6.4 oz)   SpO2 94%   BMI 47.98 kg/m²       Class 3 Severe Obesity (BMI >=40). Obesity-related health conditions include the following: diabetes mellitus. Obesity is unchanged. BMI is is above average; BMI management plan is completed. We discussed portion control and increasing exercise.      Current Outpatient Medications:     albuterol sulfate  (90 Base) MCG/ACT inhaler, Inhale 2 puffs Every 6 (Six) Hours As Needed for Wheezing., Disp: 1 inhaler, Rfl: 0    Blood Glucose Monitoring Suppl (Accu-Chek Guide) w/Device kit, Use 1 Device Daily., Disp: 1 kit, Rfl: 0    glucose blood (Accu-Chek Guide) test strip, 1 each by Other route Daily., Disp: 100 each, Rfl: 0    Lancets (accu-chek multiclix) lancets, 1 each by Other route Daily., Disp: 100 each, Rfl: 0    Tirzepatide (MOUNJARO) 10 " MG/0.5ML solution pen-injector pen, Inject 0.5 mL under the skin into the appropriate area as directed 1 (One) Time Per Week., Disp: 2 mL, Rfl: 0    Objective   Physical Exam  Constitutional:       General: He is not in acute distress.     Appearance: Normal appearance. He is well-developed. He is not ill-appearing or diaphoretic.      Comments: Patient is in no distress, patient has normal voice and speech.  Normal respiratory effort.   HENT:      Head: Normocephalic and atraumatic.   Pulmonary:      Effort: Pulmonary effort is normal.   Musculoskeletal:      Cervical back: Normal range of motion and neck supple.   Neurological:      General: No focal deficit present.      Mental Status: He is alert and oriented to person, place, and time. Mental status is at baseline.   Psychiatric:         Mood and Affect: Mood normal.           FOLLOWING LABS WERE REVIEWED TODAY:  CMP          9/26/2023    08:12 3/26/2024    11:33 4/28/2024    22:01   CMP   Glucose 113  99  112    BUN 12  9  7    Creatinine 0.90  1.04  0.93    EGFR 115.7  96.6  110.5    Sodium 139  139  136    Potassium 3.8  4.1  3.8    Chloride 101  99  101    Calcium 9.5  9.7  9.3    Total Protein 7.6  7.8  7.2    Albumin 4.6  4.4  4.2    Globulin 3.0  3.4  3.0    Total Bilirubin 0.4  0.8  0.2    Alkaline Phosphatase 60  70  65    AST (SGOT) 38  46  50    ALT (SGPT) 57  64  56    Albumin/Globulin Ratio 1.5  1.3  1.4    BUN/Creatinine Ratio 13.3  8.7  7.5    Anion Gap 13.5  12.3  11.0      Lipid Panel          9/26/2023    08:12 3/26/2024    11:33   Lipid Panel   Total Cholesterol 198  191    Triglycerides 273  174    HDL Cholesterol 36  30    VLDL Cholesterol 48  31    LDL Cholesterol  114  130    LDL/HDL Ratio 2.98  4.21      Most Recent A1C          3/26/2024    11:33   HGBA1C Most Recent   Hemoglobin A1C 5.70            Assessment & Plan   Diagnoses and all orders for this visit:    1. Type 2 diabetes mellitus without complication, without long-term current  use of insulin (Primary)  -     Tirzepatide (MOUNJARO) 10 MG/0.5ML solution pen-injector pen; Inject 0.5 mL under the skin into the appropriate area as directed 1 (One) Time Per Week.  Dispense: 2 mL; Refill: 0  -     Comprehensive Metabolic Panel  -     Hemoglobin A1c    2. Chronic pain of right knee  -     MRI Knee Right Without Contrast; Future      He has been on Mounjaro now for about 3 months and has done overall well, his weight loss has been rather modest.  I will increase the dose of Mounjaro to 10 mg.  Patient would definitely benefit from weight loss and lowering his BMI.  I also reviewed his previous labs and repeat blood work is being done today.  We also addressed his chronic right knee pain.  I reviewed x-ray report from 7/2023 which showed changes consistent with prior endoscopy and some changes consistent with osteoarthritis.  Considering his symptoms I will be getting MRI of the right knee for further evaluation of the soft tissue.  Patient will need to follow-up with the orthopedist.      Return in about 3 months (around 9/26/2024) for Next scheduled follow up.    Requested Prescriptions     Signed Prescriptions Disp Refills    Tirzepatide (MOUNJARO) 10 MG/0.5ML solution pen-injector pen 2 mL 0     Sig: Inject 0.5 mL under the skin into the appropriate area as directed 1 (One) Time Per Week.       Susanna Myers MD  06/26/2024

## 2024-06-27 DIAGNOSIS — E78.2 MIXED HYPERLIPIDEMIA: Primary | ICD-10-CM

## 2024-06-27 DIAGNOSIS — D75.1 POLYCYTHEMIA: Primary | ICD-10-CM

## 2024-06-27 DIAGNOSIS — R74.8 ELEVATED LIVER ENZYMES: ICD-10-CM

## 2024-06-27 LAB
BASOPHILS # BLD AUTO: 0.09 10*3/MM3 (ref 0–0.2)
BASOPHILS NFR BLD AUTO: 1.4 % (ref 0–1.5)
DEPRECATED RDW RBC AUTO: 46.6 FL (ref 37–54)
EOSINOPHIL # BLD AUTO: 0.12 10*3/MM3 (ref 0–0.4)
EOSINOPHIL NFR BLD AUTO: 1.8 % (ref 0.3–6.2)
ERYTHROCYTE [DISTWIDTH] IN BLOOD BY AUTOMATED COUNT: 18.2 % (ref 12.3–15.4)
FERRITIN SERPL-MCNC: 42.8 NG/ML (ref 30–400)
GGT SERPL-CCNC: 100 U/L (ref 8–61)
HCT VFR BLD AUTO: 59.3 % (ref 37.5–51)
HGB BLD-MCNC: 18.4 G/DL (ref 13–17.7)
IMM GRANULOCYTES # BLD AUTO: 0.09 10*3/MM3 (ref 0–0.05)
IMM GRANULOCYTES NFR BLD AUTO: 1.4 % (ref 0–0.5)
IRON 24H UR-MRATE: 69 MCG/DL (ref 59–158)
IRON SATN MFR SERPL: 12 % (ref 20–50)
LYMPHOCYTES # BLD AUTO: 2.39 10*3/MM3 (ref 0.7–3.1)
LYMPHOCYTES NFR BLD AUTO: 36.6 % (ref 19.6–45.3)
MCH RBC QN AUTO: 24.8 PG (ref 26.6–33)
MCHC RBC AUTO-ENTMCNC: 31 G/DL (ref 31.5–35.7)
MCV RBC AUTO: 79.9 FL (ref 79–97)
MONOCYTES # BLD AUTO: 0.77 10*3/MM3 (ref 0.1–0.9)
MONOCYTES NFR BLD AUTO: 11.8 % (ref 5–12)
NEUTROPHILS NFR BLD AUTO: 3.07 10*3/MM3 (ref 1.7–7)
NEUTROPHILS NFR BLD AUTO: 47 % (ref 42.7–76)
NRBC BLD AUTO-RTO: 0 /100 WBC (ref 0–0.2)
PLATELET # BLD AUTO: 205 10*3/MM3 (ref 140–450)
PMV BLD AUTO: 10.5 FL (ref 6–12)
RBC # BLD AUTO: 7.42 10*6/MM3 (ref 4.14–5.8)
TIBC SERPL-MCNC: 599 MCG/DL (ref 298–536)
TRANSFERRIN SERPL-MCNC: 402 MG/DL (ref 200–360)
WBC NRBC COR # BLD AUTO: 6.53 10*3/MM3 (ref 3.4–10.8)

## 2024-06-27 NOTE — PROGRESS NOTES
I called and spoke to the patient and he verbally understood he is aware to stop the testosterone and will repeat blood work in 3 weeks due to going on vacation.    Please put in CBC order.    Thank you

## 2024-07-30 DIAGNOSIS — E11.9 TYPE 2 DIABETES MELLITUS WITHOUT COMPLICATION, WITHOUT LONG-TERM CURRENT USE OF INSULIN: ICD-10-CM

## 2024-07-30 RX ORDER — TIRZEPATIDE 10 MG/.5ML
INJECTION, SOLUTION SUBCUTANEOUS
Qty: 2 ML | Refills: 0 | Status: SHIPPED | OUTPATIENT
Start: 2024-07-30

## 2024-09-23 ENCOUNTER — TELEPHONE (OUTPATIENT)
Dept: FAMILY MEDICINE CLINIC | Facility: CLINIC | Age: 34
End: 2024-09-23
Payer: COMMERCIAL

## 2024-10-16 DIAGNOSIS — E11.9 TYPE 2 DIABETES MELLITUS WITHOUT COMPLICATION, WITHOUT LONG-TERM CURRENT USE OF INSULIN: ICD-10-CM

## 2024-10-17 RX ORDER — TIRZEPATIDE 10 MG/.5ML
INJECTION, SOLUTION SUBCUTANEOUS
Qty: 2 ML | Refills: 0 | Status: SHIPPED | OUTPATIENT
Start: 2024-10-17

## 2024-11-13 DIAGNOSIS — E11.9 TYPE 2 DIABETES MELLITUS WITHOUT COMPLICATION, WITHOUT LONG-TERM CURRENT USE OF INSULIN: ICD-10-CM

## 2024-11-13 RX ORDER — TIRZEPATIDE 10 MG/.5ML
INJECTION, SOLUTION SUBCUTANEOUS
Qty: 2 ML | OUTPATIENT
Start: 2024-11-13

## 2024-11-15 DIAGNOSIS — E11.9 TYPE 2 DIABETES MELLITUS WITHOUT COMPLICATION, WITHOUT LONG-TERM CURRENT USE OF INSULIN: ICD-10-CM

## 2024-11-16 RX ORDER — TIRZEPATIDE 10 MG/.5ML
INJECTION, SOLUTION SUBCUTANEOUS
Qty: 2 ML | OUTPATIENT
Start: 2024-11-16

## 2024-12-20 ENCOUNTER — OFFICE VISIT (OUTPATIENT)
Dept: FAMILY MEDICINE CLINIC | Facility: CLINIC | Age: 34
End: 2024-12-20
Payer: COMMERCIAL

## 2024-12-20 ENCOUNTER — LAB (OUTPATIENT)
Dept: FAMILY MEDICINE CLINIC | Facility: CLINIC | Age: 34
End: 2024-12-20
Payer: COMMERCIAL

## 2024-12-20 VITALS
BODY MASS INDEX: 47.43 KG/M2 | HEART RATE: 53 BPM | HEIGHT: 67 IN | TEMPERATURE: 98.2 F | SYSTOLIC BLOOD PRESSURE: 143 MMHG | DIASTOLIC BLOOD PRESSURE: 90 MMHG | RESPIRATION RATE: 16 BRPM | OXYGEN SATURATION: 95 % | WEIGHT: 302.2 LBS

## 2024-12-20 DIAGNOSIS — R74.8 ELEVATED LIVER ENZYMES: ICD-10-CM

## 2024-12-20 DIAGNOSIS — E78.2 MIXED HYPERLIPIDEMIA: ICD-10-CM

## 2024-12-20 DIAGNOSIS — E11.9 TYPE 2 DIABETES MELLITUS WITHOUT COMPLICATION, WITHOUT LONG-TERM CURRENT USE OF INSULIN: Primary | ICD-10-CM

## 2024-12-20 DIAGNOSIS — E66.01 MORBID OBESITY: ICD-10-CM

## 2024-12-20 PROBLEM — R14.0 ABDOMINAL BLOATING: Status: RESOLVED | Noted: 2022-10-20 | Resolved: 2024-12-20

## 2024-12-20 PROBLEM — T88.7XXA MEDICATION SIDE EFFECTS: Status: RESOLVED | Noted: 2023-10-25 | Resolved: 2024-12-20

## 2024-12-20 PROBLEM — Z30.09 VASECTOMY EVALUATION: Status: RESOLVED | Noted: 2023-05-09 | Resolved: 2024-12-20

## 2024-12-20 LAB
BASOPHILS # BLD AUTO: 0.07 10*3/MM3 (ref 0–0.2)
BASOPHILS NFR BLD AUTO: 1 % (ref 0–1.5)
DEPRECATED RDW RBC AUTO: 42.2 FL (ref 37–54)
EOSINOPHIL # BLD AUTO: 0.13 10*3/MM3 (ref 0–0.4)
EOSINOPHIL NFR BLD AUTO: 1.9 % (ref 0.3–6.2)
ERYTHROCYTE [DISTWIDTH] IN BLOOD BY AUTOMATED COUNT: 14.3 % (ref 12.3–15.4)
HCT VFR BLD AUTO: 52.9 % (ref 37.5–51)
HGB BLD-MCNC: 16.5 G/DL (ref 13–17.7)
IMM GRANULOCYTES # BLD AUTO: 0.07 10*3/MM3 (ref 0–0.05)
IMM GRANULOCYTES NFR BLD AUTO: 1 % (ref 0–0.5)
LYMPHOCYTES # BLD AUTO: 2.51 10*3/MM3 (ref 0.7–3.1)
LYMPHOCYTES NFR BLD AUTO: 35.9 % (ref 19.6–45.3)
MCH RBC QN AUTO: 25.9 PG (ref 26.6–33)
MCHC RBC AUTO-ENTMCNC: 31.2 G/DL (ref 31.5–35.7)
MCV RBC AUTO: 83 FL (ref 79–97)
MONOCYTES # BLD AUTO: 0.77 10*3/MM3 (ref 0.1–0.9)
MONOCYTES NFR BLD AUTO: 11 % (ref 5–12)
NEUTROPHILS NFR BLD AUTO: 3.45 10*3/MM3 (ref 1.7–7)
NEUTROPHILS NFR BLD AUTO: 49.2 % (ref 42.7–76)
NRBC BLD AUTO-RTO: 0 /100 WBC (ref 0–0.2)
PLATELET # BLD AUTO: 226 10*3/MM3 (ref 140–450)
PMV BLD AUTO: 11.4 FL (ref 6–12)
RBC # BLD AUTO: 6.37 10*6/MM3 (ref 4.14–5.8)
WBC NRBC COR # BLD AUTO: 7 10*3/MM3 (ref 3.4–10.8)

## 2024-12-20 PROCEDURE — 83036 HEMOGLOBIN GLYCOSYLATED A1C: CPT | Performed by: FAMILY MEDICINE

## 2024-12-20 PROCEDURE — 80053 COMPREHEN METABOLIC PANEL: CPT | Performed by: FAMILY MEDICINE

## 2024-12-20 PROCEDURE — 36415 COLL VENOUS BLD VENIPUNCTURE: CPT | Performed by: FAMILY MEDICINE

## 2024-12-20 PROCEDURE — 85025 COMPLETE CBC W/AUTO DIFF WBC: CPT | Performed by: FAMILY MEDICINE

## 2024-12-20 RX ORDER — TIRZEPATIDE 2.5 MG/.5ML
2.5 INJECTION, SOLUTION SUBCUTANEOUS WEEKLY
Qty: 2 ML | Refills: 0 | Status: SHIPPED | OUTPATIENT
Start: 2024-12-20

## 2024-12-20 NOTE — PROGRESS NOTES
"Subjective   Chief Complaint   Patient presents with    Diabetes    Hyperlipidemia    Med Refill    Obesity     Juvenal Rossi is a 34 y.o. male.     Patient Care Team:  Susanna Myers MD as PCP - General (Family Medicine)    History of Present Illness  He is coming in today to follow-up on his chronic medical problems and his medications including diabetes, hyperlipidemia, elevated liver enzymes, and high BMI.  Patient was previously on Mounjaro and he reports that he did well with that, but he ran out of the prescription about 1 month ago.  Since then he has gained some weight.  He is still followed by a local \"men's clinic\" and he is on testosterone shots.  He has struggled with his weight for many years.  He tells me that he started gaining weight about 10 years ago even with a good diet and exercise.  At that time he had a very detailed workup done through his previous doctor and it all turnout to be negative.  He       The following portions of the patient's history were reviewed and updated as appropriate: allergies, current medications, past family history, past medical history, past social history, past surgical history, and problem list.  Past Medical History:   Diagnosis Date    Low testosterone in male     Obesity     Rupture of anterior cruciate ligament of right knee      Past Surgical History:   Procedure Laterality Date    KNEE ACL RECONSTRUCTION Right     KNEE ARTHROSCOPY W/ ACL RECONSTRUCTION      x2    KNEE MENISCAL REPAIR Right     MENISCECTOMY Left 2008     The patient has a family history of  Family History   Problem Relation Age of Onset    Cancer Mother         Breast cancer    Aneurysm Father      Social History     Socioeconomic History    Marital status: Single   Tobacco Use    Smoking status: Never     Passive exposure: Never    Smokeless tobacco: Never   Vaping Use    Vaping status: Some Days   Substance and Sexual Activity    Alcohol use: Yes     Alcohol/week: 24.0 standard drinks " "of alcohol     Types: 24 Cans of beer per week    Drug use: Not Currently    Sexual activity: Yes     Partners: Female       Review of Systems   Constitutional:  Negative for activity change, fatigue and fever.   Respiratory:  Negative for shortness of breath and wheezing.    Cardiovascular:  Negative for chest pain, palpitations and leg swelling.   Musculoskeletal:  Negative for arthralgias and back pain.   Skin:  Negative for rash.   Neurological:  Negative for tremors and headache.     Visit Vitals  /90 (BP Location: Left arm, Patient Position: Sitting, Cuff Size: Large Adult)   Pulse 53   Temp 98.2 °F (36.8 °C) (Temporal)   Resp 16   Ht 170.2 cm (67.01\")   Wt (!) 137 kg (302 lb 3.2 oz)   SpO2 95%   BMI 47.32 kg/m²              Current Outpatient Medications:     albuterol sulfate  (90 Base) MCG/ACT inhaler, Inhale 2 puffs Every 6 (Six) Hours As Needed for Wheezing., Disp: 1 inhaler, Rfl: 0    Blood Glucose Monitoring Suppl (Accu-Chek Guide) w/Device kit, Use 1 Device Daily., Disp: 1 kit, Rfl: 0    glucose blood (Accu-Chek Guide) test strip, 1 each by Other route Daily., Disp: 100 each, Rfl: 0    Lancets (accu-chek multiclix) lancets, 1 each by Other route Daily., Disp: 100 each, Rfl: 0    Mounjaro 2.5 MG/0.5ML solution auto-injector, Inject 2.5 mg under the skin into the appropriate area as directed 1 (One) Time Per Week., Disp: 2 mL, Rfl: 0    Objective   Physical Exam  Vitals and nursing note reviewed.   Constitutional:       General: He is not in acute distress.     Appearance: Normal appearance. He is well-developed. He is not ill-appearing.   HENT:      Head: Normocephalic and atraumatic.   Cardiovascular:      Rate and Rhythm: Normal rate and regular rhythm.      Heart sounds: Normal heart sounds. No murmur heard.     No gallop.   Pulmonary:      Effort: Pulmonary effort is normal. No respiratory distress.      Breath sounds: Normal breath sounds. No wheezing, rhonchi or rales.   Chest:      " Chest wall: No tenderness.   Musculoskeletal:      Cervical back: Normal range of motion and neck supple.   Neurological:      General: No focal deficit present.      Mental Status: He is alert and oriented to person, place, and time. Mental status is at baseline.   Psychiatric:         Mood and Affect: Mood normal.         FOLLOWING LABS WERE REVIEWED TODAY:  CMP          4/28/2024    22:01 6/26/2024    11:17 12/20/2024    13:28   CMP   Glucose 112  89  125    BUN 7  7  14    Creatinine 0.93  0.98  0.98    EGFR 110.5  103.8  103.8    Sodium 136  140  140    Potassium 3.8  4.3  3.9    Chloride 101  101  102    Calcium 9.3  9.1  9.1    Total Protein 7.2  7.3  7.3    Albumin 4.2  4.4  4.3    Globulin 3.0  2.9  3.0    Total Bilirubin 0.2  0.3  0.3    Alkaline Phosphatase 65  52  62    AST (SGOT) 50  49  34    ALT (SGPT) 56  100  48    Albumin/Globulin Ratio 1.4  1.5  1.4    BUN/Creatinine Ratio 7.5  7.1  14.3    Anion Gap 11.0  11.7  10.9      Lipid Panel          3/26/2024    11:33   Lipid Panel   Total Cholesterol 191    Triglycerides 174    HDL Cholesterol 30    VLDL Cholesterol 31    LDL Cholesterol  130    LDL/HDL Ratio 4.21      Most Recent A1C          12/20/2024    13:28   HGBA1C Most Recent   Hemoglobin A1C 6.00         Assessment & Plan   Diagnoses and all orders for this visit:    1. Type 2 diabetes mellitus without complication, without long-term current use of insulin (Primary)  -     Mounjaro 2.5 MG/0.5ML solution auto-injector; Inject 2.5 mg under the skin into the appropriate area as directed 1 (One) Time Per Week.  Dispense: 2 mL; Refill: 0  -     Comprehensive Metabolic Panel  -     Hemoglobin A1c    2. Mixed hyperlipidemia    3. Elevated liver enzymes    4. Morbid obesity    5. BMI 45.0-49.9, adult        I reviewed his chronic medical problems and his medications as well as his previous labs and the new set of blood work is being done today.  I will be starting him back on Mounjaro and we will start  with the smallest dose as patient has gone several weeks without the medication.  Compliance with medication and diet was also discussed and reinforced.  He definitely would benefit from weight loss and lowering his BMI.      Return in about 3 months (around 3/20/2025) for Next scheduled follow up.    Requested Prescriptions     Signed Prescriptions Disp Refills    Mounjaro 2.5 MG/0.5ML solution auto-injector 2 mL 0     Sig: Inject 2.5 mg under the skin into the appropriate area as directed 1 (One) Time Per Week.       Susanna Myers MD  12/20/2024

## 2024-12-21 LAB
ALBUMIN SERPL-MCNC: 4.3 G/DL (ref 3.5–5.2)
ALBUMIN/GLOB SERPL: 1.4 G/DL
ALP SERPL-CCNC: 62 U/L (ref 39–117)
ALT SERPL W P-5'-P-CCNC: 48 U/L (ref 1–41)
ANION GAP SERPL CALCULATED.3IONS-SCNC: 10.9 MMOL/L (ref 5–15)
AST SERPL-CCNC: 34 U/L (ref 1–40)
BILIRUB SERPL-MCNC: 0.3 MG/DL (ref 0–1.2)
BUN SERPL-MCNC: 14 MG/DL (ref 6–20)
BUN/CREAT SERPL: 14.3 (ref 7–25)
CALCIUM SPEC-SCNC: 9.1 MG/DL (ref 8.6–10.5)
CHLORIDE SERPL-SCNC: 102 MMOL/L (ref 98–107)
CO2 SERPL-SCNC: 27.1 MMOL/L (ref 22–29)
CREAT SERPL-MCNC: 0.98 MG/DL (ref 0.76–1.27)
EGFRCR SERPLBLD CKD-EPI 2021: 103.8 ML/MIN/1.73
GLOBULIN UR ELPH-MCNC: 3 GM/DL
GLUCOSE SERPL-MCNC: 125 MG/DL (ref 65–99)
HBA1C MFR BLD: 6 % (ref 4.8–5.6)
POTASSIUM SERPL-SCNC: 3.9 MMOL/L (ref 3.5–5.2)
PROT SERPL-MCNC: 7.3 G/DL (ref 6–8.5)
SODIUM SERPL-SCNC: 140 MMOL/L (ref 136–145)

## 2025-01-16 RX ORDER — TIRZEPATIDE 5 MG/.5ML
5 INJECTION, SOLUTION SUBCUTANEOUS WEEKLY
Qty: 2 ML | Refills: 0 | Status: SHIPPED | OUTPATIENT
Start: 2025-01-16

## 2025-01-17 ENCOUNTER — TELEPHONE (OUTPATIENT)
Dept: FAMILY MEDICINE CLINIC | Facility: CLINIC | Age: 35
End: 2025-01-17

## 2025-01-17 NOTE — TELEPHONE ENCOUNTER
Hub staff attempted to follow warm transfer process and was unsuccessful     Caller: Juvenal Rossi    Relationship to patient: Self    Best call back number:   Telephone Information:   Mobile [phone]         Patient is needing: PATIENT RETURNING PHONE CALL. PLEASE CALL BACK             
I called the patient to advise him that prescription for Mounjaro was sent in.  
21-Aug-2022

## 2025-02-16 RX ORDER — TIRZEPATIDE 5 MG/.5ML
INJECTION, SOLUTION SUBCUTANEOUS
Qty: 2 ML | Refills: 0 | Status: SHIPPED | OUTPATIENT
Start: 2025-02-16

## 2025-03-10 ENCOUNTER — TELEPHONE (OUTPATIENT)
Dept: FAMILY MEDICINE CLINIC | Facility: CLINIC | Age: 35
End: 2025-03-10

## 2025-03-10 NOTE — TELEPHONE ENCOUNTER
The form was filled out and faxed to employer today.  Patient was previously advised to follow-up with his orthopedist.  Did he schedule that appointment?

## 2025-03-10 NOTE — TELEPHONE ENCOUNTER
Caller: Juvenal Rossi    Relationship: Self    Best call back number: [phone]     What is the best time to reach you: ANYTIME    What was the call regarding: PATIENT STATED HE DROPPED OFF FMLA PAPERWORK ABOUT 2 MONTHS AGO TO BE COMPLETED BY DR CHAN, AND FAXED BACK TO HIS EMPLOYER.    PATIENT STATED HIS EMPLOYER INFORMED HIM, THEY HAVE NOT RECEIVED ANY FMLA PAPERWORK FOR HIM.    PATIENT IS REQUESTING TO KNOW THE STATUS OF THIS PAPERWORK.    PLEASE CALL TO DISCUSS AND ADVISE.    Is it okay if the provider responds through MyChart: NO

## 2025-05-24 ENCOUNTER — HOSPITAL ENCOUNTER (EMERGENCY)
Facility: HOSPITAL | Age: 35
Discharge: HOME | End: 2025-05-25
Payer: COMMERCIAL

## 2025-05-24 DIAGNOSIS — R19.7 DIARRHEA, UNSPECIFIED TYPE: Primary | ICD-10-CM

## 2025-05-24 DIAGNOSIS — J02.9 SORE THROAT: ICD-10-CM

## 2025-05-24 DIAGNOSIS — R10.9 ABDOMINAL CRAMPS: ICD-10-CM

## 2025-05-24 LAB
ADV 40+41 DNA STL QL NAA+NON-PROBE: NOT DETECTED
ALBUMIN SERPL-MCNC: 4.9 G/DL (ref 3.5–5.2)
ALBUMIN/GLOB SERPL: 1.5 G/DL
ALP SERPL-CCNC: 77 U/L (ref 39–117)
ALT SERPL W P-5'-P-CCNC: 49 U/L (ref 1–41)
ANION GAP SERPL CALCULATED.3IONS-SCNC: 13.3 MMOL/L (ref 5–15)
AST SERPL-CCNC: 33 U/L (ref 1–40)
ASTRO TYP 1-8 RNA STL QL NAA+NON-PROBE: NOT DETECTED
B PARAPERT DNA SPEC QL NAA+PROBE: NOT DETECTED
B PERT DNA SPEC QL NAA+PROBE: NOT DETECTED
BASOPHILS # BLD AUTO: 0.05 10*3/MM3 (ref 0–0.2)
BASOPHILS NFR BLD AUTO: 0.7 % (ref 0–1.5)
BILIRUB SERPL-MCNC: 0.4 MG/DL (ref 0–1.2)
BUN SERPL-MCNC: 8 MG/DL (ref 6–20)
BUN/CREAT SERPL: 8.5 (ref 7–25)
C CAYETANENSIS DNA STL QL NAA+NON-PROBE: NOT DETECTED
C COLI+JEJ+UPSA DNA STL QL NAA+NON-PROBE: NOT DETECTED
C PNEUM DNA NPH QL NAA+NON-PROBE: NOT DETECTED
CALCIUM SPEC-SCNC: 9.8 MG/DL (ref 8.6–10.5)
CHLORIDE SERPL-SCNC: 98 MMOL/L (ref 98–107)
CO2 SERPL-SCNC: 27.7 MMOL/L (ref 22–29)
CREAT SERPL-MCNC: 0.94 MG/DL (ref 0.76–1.27)
CRYPTOSP DNA STL QL NAA+NON-PROBE: NOT DETECTED
DEPRECATED RDW RBC AUTO: 42 FL (ref 37–54)
E HISTOLYT DNA STL QL NAA+NON-PROBE: NOT DETECTED
EAEC PAA PLAS AGGR+AATA ST NAA+NON-PRB: NOT DETECTED
EC STX1+STX2 GENES STL QL NAA+NON-PROBE: NOT DETECTED
EGFRCR SERPLBLD CKD-EPI 2021: 108.4 ML/MIN/1.73
EOSINOPHIL # BLD AUTO: 0.1 10*3/MM3 (ref 0–0.4)
EOSINOPHIL NFR BLD AUTO: 1.4 % (ref 0.3–6.2)
EPEC EAE GENE STL QL NAA+NON-PROBE: NOT DETECTED
ERYTHROCYTE [DISTWIDTH] IN BLOOD BY AUTOMATED COUNT: 14 % (ref 12.3–15.4)
ETEC LTA+ST1A+ST1B TOX ST NAA+NON-PROBE: NOT DETECTED
FLUAV SUBTYP SPEC NAA+PROBE: NOT DETECTED
FLUBV RNA ISLT QL NAA+PROBE: NOT DETECTED
G LAMBLIA DNA STL QL NAA+NON-PROBE: NOT DETECTED
GLOBULIN UR ELPH-MCNC: 3.2 GM/DL
GLUCOSE SERPL-MCNC: 100 MG/DL (ref 65–99)
HADV DNA SPEC NAA+PROBE: NOT DETECTED
HCOV 229E RNA SPEC QL NAA+PROBE: NOT DETECTED
HCOV HKU1 RNA SPEC QL NAA+PROBE: NOT DETECTED
HCOV NL63 RNA SPEC QL NAA+PROBE: NOT DETECTED
HCOV OC43 RNA SPEC QL NAA+PROBE: NOT DETECTED
HCT VFR BLD AUTO: 48.1 % (ref 37.5–51)
HETEROPH AB SER QL LA: NEGATIVE
HGB BLD-MCNC: 15.5 G/DL (ref 13–17.7)
HMPV RNA NPH QL NAA+NON-PROBE: NOT DETECTED
HOLD SPECIMEN: NORMAL
HOLD SPECIMEN: NORMAL
HPIV1 RNA ISLT QL NAA+PROBE: NOT DETECTED
HPIV2 RNA SPEC QL NAA+PROBE: NOT DETECTED
HPIV3 RNA NPH QL NAA+PROBE: NOT DETECTED
HPIV4 P GENE NPH QL NAA+PROBE: NOT DETECTED
IMM GRANULOCYTES # BLD AUTO: 0.12 10*3/MM3 (ref 0–0.05)
IMM GRANULOCYTES NFR BLD AUTO: 1.6 % (ref 0–0.5)
LIPASE SERPL-CCNC: 43 U/L (ref 13–60)
LYMPHOCYTES # BLD AUTO: 2.02 10*3/MM3 (ref 0.7–3.1)
LYMPHOCYTES NFR BLD AUTO: 27.6 % (ref 19.6–45.3)
M PNEUMO IGG SER IA-ACNC: NOT DETECTED
MCH RBC QN AUTO: 26.9 PG (ref 26.6–33)
MCHC RBC AUTO-ENTMCNC: 32.2 G/DL (ref 31.5–35.7)
MCV RBC AUTO: 83.4 FL (ref 79–97)
MONOCYTES # BLD AUTO: 0.68 10*3/MM3 (ref 0.1–0.9)
MONOCYTES NFR BLD AUTO: 9.3 % (ref 5–12)
NEUTROPHILS NFR BLD AUTO: 4.34 10*3/MM3 (ref 1.7–7)
NEUTROPHILS NFR BLD AUTO: 59.4 % (ref 42.7–76)
NOROVIRUS GI+II RNA STL QL NAA+NON-PROBE: NOT DETECTED
NRBC BLD AUTO-RTO: 0 /100 WBC (ref 0–0.2)
P SHIGELLOIDES DNA STL QL NAA+NON-PROBE: NOT DETECTED
PLATELET # BLD AUTO: 209 10*3/MM3 (ref 140–450)
PMV BLD AUTO: 9.6 FL (ref 6–12)
POTASSIUM SERPL-SCNC: 4.4 MMOL/L (ref 3.5–5.2)
PROT SERPL-MCNC: 8.1 G/DL (ref 6–8.5)
RBC # BLD AUTO: 5.77 10*6/MM3 (ref 4.14–5.8)
RHINOVIRUS RNA SPEC NAA+PROBE: NOT DETECTED
RSV RNA NPH QL NAA+NON-PROBE: NOT DETECTED
RVA RNA STL QL NAA+NON-PROBE: NOT DETECTED
S ENT+BONG DNA STL QL NAA+NON-PROBE: NOT DETECTED
S PYO AG THROAT QL: NEGATIVE
SAPO I+II+IV+V RNA STL QL NAA+NON-PROBE: NOT DETECTED
SARS-COV-2 RNA RESP QL NAA+PROBE: NOT DETECTED
SHIGELLA SP+EIEC IPAH ST NAA+NON-PROBE: NOT DETECTED
SODIUM SERPL-SCNC: 139 MMOL/L (ref 136–145)
V CHOL+PARA+VUL DNA STL QL NAA+NON-PROBE: NOT DETECTED
V CHOLERAE DNA STL QL NAA+NON-PROBE: NOT DETECTED
WBC NRBC COR # BLD AUTO: 7.31 10*3/MM3 (ref 3.4–10.8)
WHOLE BLOOD HOLD COAG: NORMAL
WHOLE BLOOD HOLD SPECIMEN: NORMAL
Y ENTEROCOL DNA STL QL NAA+NON-PROBE: NOT DETECTED

## 2025-05-24 PROCEDURE — 99283 EMERGENCY DEPT VISIT LOW MDM: CPT

## 2025-05-24 PROCEDURE — 85025 COMPLETE CBC W/AUTO DIFF WBC: CPT | Performed by: NURSE PRACTITIONER

## 2025-05-24 PROCEDURE — 87507 IADNA-DNA/RNA PROBE TQ 12-25: CPT | Performed by: NURSE PRACTITIONER

## 2025-05-24 PROCEDURE — 0202U NFCT DS 22 TRGT SARS-COV-2: CPT | Performed by: NURSE PRACTITIONER

## 2025-05-24 PROCEDURE — 86308 HETEROPHILE ANTIBODY SCREEN: CPT | Performed by: NURSE PRACTITIONER

## 2025-05-24 PROCEDURE — 87651 STREP A DNA AMP PROBE: CPT | Performed by: NURSE PRACTITIONER

## 2025-05-24 PROCEDURE — 83690 ASSAY OF LIPASE: CPT | Performed by: NURSE PRACTITIONER

## 2025-05-24 PROCEDURE — 80053 COMPREHEN METABOLIC PANEL: CPT | Performed by: NURSE PRACTITIONER

## 2025-05-24 RX ORDER — SODIUM CHLORIDE 0.9 % (FLUSH) 0.9 %
10 SYRINGE (ML) INJECTION AS NEEDED
Status: DISCONTINUED | OUTPATIENT
Start: 2025-05-24 | End: 2025-05-25 | Stop reason: HOSPADM

## 2025-05-25 VITALS
BODY MASS INDEX: 49.44 KG/M2 | HEIGHT: 67 IN | OXYGEN SATURATION: 97 % | HEART RATE: 71 BPM | RESPIRATION RATE: 17 BRPM | DIASTOLIC BLOOD PRESSURE: 93 MMHG | SYSTOLIC BLOOD PRESSURE: 126 MMHG | WEIGHT: 315 LBS | TEMPERATURE: 98.6 F

## 2025-05-25 RX ORDER — DICYCLOMINE HCL 20 MG
20 TABLET ORAL EVERY 8 HOURS PRN
Qty: 12 TABLET | Refills: 0 | Status: SHIPPED | OUTPATIENT
Start: 2025-05-25

## 2025-05-25 NOTE — DISCHARGE INSTRUCTIONS
Take medication as prescribed.  Continue current home medications.  Drink plenty of fluids.  Follow-up with your primary care provider if no improvement.  Return to the ED for new or worsening symptoms.

## 2025-05-25 NOTE — ED PROVIDER NOTES
Subjective   History of Present Illness  Patient is a 35-year-old male history of diabetes presents today with complaints of loose nonbloody stools, intermittent abdominal cramping and sore throat.  He states symptoms started few days ago.  He tried to treat conservatively at home but his symptoms have not improved.  Denies any vomiting but does report some nausea.  States he had a sore throat slight cough, no fever or chills.  He denies any chest pain or shortness of breath.  Denies any known ill contacts or recent travel.  No recent antibiotic use.      Review of Systems   Constitutional:  Negative for fever.   HENT:  Positive for sore throat. Negative for congestion.    Respiratory:  Positive for cough. Negative for shortness of breath.    Cardiovascular:  Negative for chest pain.   Gastrointestinal:  Positive for diarrhea and nausea. Negative for blood in stool and vomiting.        Intermittent abdominal cramping   Genitourinary:  Negative for dysuria.       Past Medical History:   Diagnosis Date    Low testosterone in male     Obesity     Rupture of anterior cruciate ligament of right knee        Allergies   Allergen Reactions    Metformin GI Intolerance     Also had some breathing problems       Past Surgical History:   Procedure Laterality Date    KNEE ACL RECONSTRUCTION Right     KNEE ARTHROSCOPY W/ ACL RECONSTRUCTION      x2    KNEE MENISCAL REPAIR Right     MENISCECTOMY Left 2008       Family History   Problem Relation Age of Onset    Cancer Mother         Breast cancer    Aneurysm Father        Social History     Socioeconomic History    Marital status: Single   Tobacco Use    Smoking status: Never     Passive exposure: Never    Smokeless tobacco: Never   Vaping Use    Vaping status: Some Days   Substance and Sexual Activity    Alcohol use: Yes     Alcohol/week: 24.0 standard drinks of alcohol     Types: 24 Cans of beer per week    Drug use: Not Currently    Sexual activity: Yes     Partners: Female            Objective   Physical Exam  Vital signs and triage nurse note reviewed.  Constitutional: Awake, alert; well-developed and well-nourished. No acute distress is noted.  HEENT: Normocephalic, atraumatic; pupils are PERRL with intact EOM; oropharynx is pink and moist without exudate or erythema.  No drooling or pooling of oral secretions.  Neck: Supple  Cardiovascular: Regular rate and rhythm, normal S1-S2.  No murmur noted.  Pulmonary: Respiratory effort regular nonlabored, breath sounds clear to auscultation all fields.  Abdomen: Soft, nontender, nondistended with normoactive bowel sounds; no rebound or guarding.  Musculoskeletal: Independent range of motion of all extremities with no palpable tenderness or edema.   Skin: Flesh tone, warm, dry, intact; no erythematous or petechial rash or lesion.    Procedures           ED Course        Labs Reviewed   COMPREHENSIVE METABOLIC PANEL - Abnormal; Notable for the following components:       Result Value    Glucose 100 (*)     ALT (SGPT) 49 (*)     All other components within normal limits    Narrative:     GFR Categories in Chronic Kidney Disease (CKD)              GFR Category          GFR (mL/min/1.73)    Interpretation  G1                    90 or greater        Normal or high (1)  G2                    60-89                Mild decrease (1)  G3a                   45-59                Mild to moderate decrease  G3b                   30-44                Moderate to severe decrease  G4                    15-29                Severe decrease  G5                    14 or less           Kidney failure    (1)In the absence of evidence of kidney disease, neither GFR category G1 or G2 fulfill the criteria for CKD.    eGFR calculation 2021 CKD-EPI creatinine equation, which does not include race as a factor   CBC WITH AUTO DIFFERENTIAL - Abnormal; Notable for the following components:    Immature Grans % 1.6 (*)     Immature Grans, Absolute 0.12 (*)     All other  components within normal limits   RESPIRATORY PANEL PCR W/ COVID-19 (SARS-COV-2), NP SWAB IN UTM/VTP, 2 HR TAT - Normal    Narrative:     In the setting of a positive respiratory panel with a viral infection PLUS a negative procalcitonin without other underlying concern for bacterial infection, consider observing off antibiotics or discontinuation of antibiotics and continue supportive care. If the respiratory panel is positive for atypical bacterial infection (Bordetella pertussis, Chlamydophila pneumoniae, or Mycoplasma pneumoniae), consider antibiotic de-escalation to target atypical bacterial infection.   RAPID STREP A SCREEN - Normal   GASTROINTESTINAL PANEL, PCR (PREFERRED) DOES NOT INCLUDE CDIFF - Normal   LIPASE - Normal   MONONUCLEOSIS SCREEN - Normal   RAINBOW DRAW    Narrative:     The following orders were created for panel order [place] Draw.  Procedure                               Abnormality         Status                     ---------                               -----------         ------                     Green Top (Gel)[[id number]]                                  Final result               Lavender Top[[id number]]                                     Final result               Gold Top - SST[[id number]]                                   Final result               Light Blue Top[[id number]]                                   Final result                 Please view results for these tests on the individual orders.   GREEN TOP   LAVENDER TOP   GOLD TOP - SST   LIGHT BLUE TOP   CBC AND DIFFERENTIAL    Narrative:     The following orders were created for panel order CBC & Differential.  Procedure                               Abnormality         Status                     ---------                               -----------         ------                     CBC Auto Differential[[id number]]        Abnormal            Final result                 Please view results for these tests on the individual  orders.     No radiology results for the last day  Medications   sodium chloride 0.9 % flush 10 mL (has no administration in time range)                                                    Medical Decision Making  Patient presents today with the above complaint.    He had the above exam and evaluation.  IV was established.  Labs were obtained.    Workup: CBC and metabolic panel are unremarkable.  Normal lipase.  Rapid strep is negative.  Monospot negative.  Respiratory panel was negative.  GI panel was negative.    On reexamination patient resting quietly no distress.  No new complaints.  He is well-appearing afebrile hemodynamically stable.  His abdomen is soft and nontender.    Findings were discussed with him.  He will be discharged home with prescription for Bentyl and instructed follow-up with his primary care provider.  He was given return precautions and voiced understanding.    Problems Addressed:  Abdominal cramps: complicated acute illness or injury  Diarrhea, unspecified type: complicated acute illness or injury  Sore throat: complicated acute illness or injury    Amount and/or Complexity of Data Reviewed  Labs: ordered.    Risk  Prescription drug management.        Final diagnoses:   Diarrhea, unspecified type   Sore throat   Abdominal cramps       ED Disposition  ED Disposition       ED Disposition   Discharge    Condition   Stable    Comment   --               Susanna Myers MD  [address] [facility]  [address]  [place] [address] [phone]               Medication List        New Prescriptions      dicyclomine 20 MG tablet  Commonly known as: BENTYL  Take 1 tablet by mouth Every 8 (Eight) Hours As Needed for Abdominal Cramping.               Where to Get Your Medications        These medications were sent to [id number] - [place], [place] - [address] AT SEC OF [place] [address] & [address] - [phone]  - [phone] FX  [address] [facility] [place] [address]       Phone: [phone]   dicyclomine 20 MG tablet            Parvin Segovia, BONG  05/25/25 0015